# Patient Record
Sex: FEMALE | Race: WHITE | NOT HISPANIC OR LATINO | Employment: UNEMPLOYED | URBAN - METROPOLITAN AREA
[De-identification: names, ages, dates, MRNs, and addresses within clinical notes are randomized per-mention and may not be internally consistent; named-entity substitution may affect disease eponyms.]

---

## 2023-08-07 ENCOUNTER — APPOINTMENT (OUTPATIENT)
Dept: PRIMARY CARE | Facility: CLINIC | Age: 33
End: 2023-08-07
Payer: COMMERCIAL

## 2024-01-08 ENCOUNTER — OFFICE VISIT (OUTPATIENT)
Dept: PRIMARY CARE | Facility: CLINIC | Age: 34
End: 2024-01-08
Payer: COMMERCIAL

## 2024-01-08 ENCOUNTER — ANCILLARY PROCEDURE (OUTPATIENT)
Dept: RADIOLOGY | Facility: CLINIC | Age: 34
End: 2024-01-08
Payer: COMMERCIAL

## 2024-01-08 VITALS
SYSTOLIC BLOOD PRESSURE: 140 MMHG | BODY MASS INDEX: 51 KG/M2 | HEART RATE: 100 BPM | DIASTOLIC BLOOD PRESSURE: 90 MMHG | WEIGHT: 277.13 LBS | HEIGHT: 62 IN

## 2024-01-08 DIAGNOSIS — F20.9 SCHIZOPHRENIA, UNSPECIFIED TYPE (MULTI): ICD-10-CM

## 2024-01-08 DIAGNOSIS — E78.2 MIXED HYPERLIPIDEMIA: ICD-10-CM

## 2024-01-08 DIAGNOSIS — J45.40 MODERATE PERSISTENT ASTHMA WITHOUT COMPLICATION (HHS-HCC): ICD-10-CM

## 2024-01-08 DIAGNOSIS — E66.01 CLASS 3 SEVERE OBESITY DUE TO EXCESS CALORIES WITH SERIOUS COMORBIDITY AND BODY MASS INDEX (BMI) OF 50.0 TO 59.9 IN ADULT (MULTI): ICD-10-CM

## 2024-01-08 DIAGNOSIS — E06.3 HYPOTHYROIDISM DUE TO HASHIMOTO'S THYROIDITIS: ICD-10-CM

## 2024-01-08 DIAGNOSIS — E06.3 HASHIMOTO'S THYROIDITIS: ICD-10-CM

## 2024-01-08 DIAGNOSIS — Z12.4 CERVICAL CANCER SCREENING: ICD-10-CM

## 2024-01-08 DIAGNOSIS — E55.9 VITAMIN D DEFICIENCY: ICD-10-CM

## 2024-01-08 DIAGNOSIS — Z76.89 ENCOUNTER TO ESTABLISH CARE: Primary | ICD-10-CM

## 2024-01-08 DIAGNOSIS — E03.8 HYPOTHYROIDISM DUE TO HASHIMOTO'S THYROIDITIS: ICD-10-CM

## 2024-01-08 DIAGNOSIS — K21.9 GASTROESOPHAGEAL REFLUX DISEASE WITHOUT ESOPHAGITIS: ICD-10-CM

## 2024-01-08 DIAGNOSIS — F79 INTELLECTUAL DISABILITY: ICD-10-CM

## 2024-01-08 DIAGNOSIS — F31.30 BIPOLAR I DISORDER, MOST RECENT EPISODE DEPRESSED (MULTI): ICD-10-CM

## 2024-01-08 DIAGNOSIS — R73.01 ELEVATED FASTING GLUCOSE: ICD-10-CM

## 2024-01-08 DIAGNOSIS — F41.1 GENERALIZED ANXIETY DISORDER: ICD-10-CM

## 2024-01-08 PROBLEM — F40.240 CLAUSTROPHOBIA: Status: RESOLVED | Noted: 2024-01-08 | Resolved: 2024-01-08

## 2024-01-08 PROBLEM — E66.813 CLASS 3 SEVERE OBESITY DUE TO EXCESS CALORIES WITH SERIOUS COMORBIDITY AND BODY MASS INDEX (BMI) OF 50.0 TO 59.9 IN ADULT: Status: ACTIVE | Noted: 2024-01-08

## 2024-01-08 PROBLEM — M72.2 PLANTAR FASCIITIS: Status: RESOLVED | Noted: 2021-04-29 | Resolved: 2024-01-08

## 2024-01-08 PROBLEM — Z91.148 NONCOMPLIANCE WITH MEDICATION TREATMENT DUE TO UNDERUSE OF MEDICATION: Status: RESOLVED | Noted: 2023-10-06 | Resolved: 2024-01-08

## 2024-01-08 PROBLEM — E78.5 HYPERLIPIDEMIA: Status: ACTIVE | Noted: 2024-01-08

## 2024-01-08 PROBLEM — E05.00 GRAVES' DISEASE: Status: RESOLVED | Noted: 2020-11-06 | Resolved: 2024-01-08

## 2024-01-08 PROBLEM — H90.3 SENSORINEURAL HEARING LOSS (SNHL) OF BOTH EARS: Status: RESOLVED | Noted: 2021-10-07 | Resolved: 2024-01-08

## 2024-01-08 PROBLEM — E66.813 CLASS 3 SEVERE OBESITY DUE TO EXCESS CALORIES WITH SERIOUS COMORBIDITY AND BODY MASS INDEX (BMI) OF 50.0 TO 59.9 IN ADULT: Status: RESOLVED | Noted: 2024-01-08 | Resolved: 2024-01-08

## 2024-01-08 PROCEDURE — 1036F TOBACCO NON-USER: CPT | Performed by: NURSE PRACTITIONER

## 2024-01-08 PROCEDURE — 76536 US EXAM OF HEAD AND NECK: CPT

## 2024-01-08 PROCEDURE — 76536 US EXAM OF HEAD AND NECK: CPT | Performed by: RADIOLOGY

## 2024-01-08 PROCEDURE — 99204 OFFICE O/P NEW MOD 45 MIN: CPT | Performed by: NURSE PRACTITIONER

## 2024-01-08 PROCEDURE — 3008F BODY MASS INDEX DOCD: CPT | Performed by: NURSE PRACTITIONER

## 2024-01-08 RX ORDER — ATORVASTATIN CALCIUM 20 MG/1
20 TABLET, FILM COATED ORAL
Qty: 30 TABLET | Refills: 5 | Status: SHIPPED | OUTPATIENT
Start: 2024-01-08 | End: 2024-03-11 | Stop reason: SDUPTHER

## 2024-01-08 RX ORDER — CETIRIZINE HYDROCHLORIDE 10 MG/1
10 TABLET ORAL
Qty: 30 TABLET | Refills: 5 | Status: SHIPPED | OUTPATIENT
Start: 2024-01-08

## 2024-01-08 RX ORDER — FAMOTIDINE 40 MG/1
40 TABLET, FILM COATED ORAL DAILY
Qty: 30 TABLET | Refills: 5 | Status: SHIPPED | OUTPATIENT
Start: 2024-01-08 | End: 2024-03-11 | Stop reason: SDUPTHER

## 2024-01-08 RX ORDER — LEVOTHYROXINE SODIUM 100 UG/1
100 TABLET ORAL
Qty: 30 TABLET | Refills: 5 | Status: SHIPPED | OUTPATIENT
Start: 2024-01-08 | End: 2024-03-11 | Stop reason: SDUPTHER

## 2024-01-08 RX ORDER — LEVOTHYROXINE SODIUM 100 UG/1
100 TABLET ORAL
COMMUNITY
End: 2024-01-08 | Stop reason: SDUPTHER

## 2024-01-08 RX ORDER — BUSPIRONE HYDROCHLORIDE 7.5 MG/1
7.5 TABLET ORAL 2 TIMES DAILY
COMMUNITY
Start: 2023-09-12

## 2024-01-08 RX ORDER — ATORVASTATIN CALCIUM 20 MG/1
20 TABLET, FILM COATED ORAL
COMMUNITY
Start: 2023-09-03 | End: 2024-01-08 | Stop reason: SDUPTHER

## 2024-01-08 RX ORDER — FLUTICASONE PROPIONATE AND SALMETEROL 250; 50 UG/1; UG/1
1 POWDER RESPIRATORY (INHALATION) 2 TIMES DAILY
COMMUNITY
Start: 2023-02-13 | End: 2024-01-08

## 2024-01-08 RX ORDER — CETIRIZINE HYDROCHLORIDE 10 MG/1
10 TABLET ORAL
COMMUNITY
Start: 2023-02-13 | End: 2024-01-08 | Stop reason: SDUPTHER

## 2024-01-08 RX ORDER — LEVOTHYROXINE SODIUM 125 UG/1
125 TABLET ORAL
Qty: 8 TABLET | Refills: 2 | Status: SHIPPED | OUTPATIENT
Start: 2024-01-08 | End: 2024-03-11 | Stop reason: SDUPTHER

## 2024-01-08 RX ORDER — FAMOTIDINE 40 MG/1
40 TABLET, FILM COATED ORAL
COMMUNITY
Start: 2023-07-27 | End: 2024-01-08 | Stop reason: SDUPTHER

## 2024-01-08 RX ORDER — ALBUTEROL SULFATE 90 UG/1
2 AEROSOL, METERED RESPIRATORY (INHALATION) EVERY 6 HOURS PRN
COMMUNITY
Start: 2023-07-27 | End: 2024-01-15 | Stop reason: SDUPTHER

## 2024-01-08 RX ORDER — ARIPIPRAZOLE 15 MG/1
15 TABLET ORAL NIGHTLY
COMMUNITY
Start: 2024-01-02 | End: 2024-05-03 | Stop reason: SDUPTHER

## 2024-01-08 RX ORDER — FLUTICASONE PROPIONATE AND SALMETEROL XINAFOATE 115; 21 UG/1; UG/1
2 AEROSOL, METERED RESPIRATORY (INHALATION)
Qty: 12 G | Refills: 5 | Status: SHIPPED | OUTPATIENT
Start: 2024-01-08 | End: 2025-01-07

## 2024-01-08 RX ORDER — OMEPRAZOLE 40 MG/1
40 CAPSULE, DELAYED RELEASE ORAL
COMMUNITY
Start: 2023-11-27 | End: 2024-01-08 | Stop reason: ALTCHOICE

## 2024-01-08 RX ORDER — ERGOCALCIFEROL 1.25 MG/1
50000 CAPSULE ORAL
COMMUNITY
Start: 2023-11-27 | End: 2024-03-11 | Stop reason: SDUPTHER

## 2024-01-08 NOTE — PROGRESS NOTES
"Subjective   Patient ID: Brittany Shaver is a 33 y.o. female who presents for Establish Care.    HPI   NPV    Brittany here with her  to establish care. Was seeing [address] [place] [address] (Dr. Renner).  She has some mental health disorders and intellectual disabilities which she has an aide that helps her but according to her -she has fired 3 of them recently and is due to get a new one this week.     She has history of hashimoto, Graves, Vitamin D def, GERD, asthma, BRENTON, Hyperlipidemia,     Graves/Hashimoto: was put on levothyroxine, but is unsure what her last level was because she had her lab work drawn but did not go back for her results.     BRENTON:  has CPAP-Dr. Mitchell.     Asthma:  does not tolerate the powder advair. States it chokes her so she hasn't been using it but without it, it makes her short of breath and that has been getting worse.     Schizophrenia/Generalized Anxiety/bipolar depression: sees Cassy/Dr. Luis at [address] [place] [address].    Hyperlipidemia: reports she was started on medicine, but doesn't think she needs it. Is currently taking it, but wants her levels rechecked to see if she really needs it.     Needs a pap: she reports she has tried x2 with different providers, but was unable to tolerate the speculum. She states, \"I've never had a baby, but it was worse than childbirth, and I know I need to have one, but I am too small down there. My xxx had to get cut down there to be able to have that done.\" She is not currently sexually active due to this.     She also has complaints regarding her feet today, she is already set up with podiatry-dr fabian at [address] [place] [address]. I advised her to return there to get an appt as she has been working closely with her.    Tobacco: denies  ETOH: denies  Recreational Drugs: denies    Review of Systems   Constitutional:  Negative for fatigue.   Respiratory:  Negative for chest tightness and shortness of breath.    Cardiovascular:  Negative for chest pain, palpitations " "and leg swelling.   Gastrointestinal:  Negative for abdominal pain, blood in stool, constipation, diarrhea, nausea and vomiting.   Genitourinary:  Negative for dysuria.        Needs cervical pap exam  Unable to tolerate exam/speculum   Musculoskeletal:  Negative for arthralgias and myalgias.   Skin:  Negative for color change.   Neurological:  Negative for dizziness, light-headedness and headaches.   Psychiatric/Behavioral:  Positive for decreased concentration. Negative for agitation, behavioral problems, dysphoric mood, self-injury, sleep disturbance and suicidal ideas. The patient is nervous/anxious and is hyperactive.        Objective   /90   Pulse 100   Ht 1.575 m (5' 2\")   Wt 126 kg (277 lb 2 oz)   BMI 50.69 kg/m²     Physical Exam  Vitals and nursing note reviewed.   Constitutional:       Appearance: Normal appearance.   HENT:      Head: Normocephalic and atraumatic.   Cardiovascular:      Rate and Rhythm: Normal rate and regular rhythm.      Pulses: Normal pulses.      Heart sounds: Normal heart sounds.   Pulmonary:      Effort: Pulmonary effort is normal.      Breath sounds: Normal breath sounds.   Abdominal:      General: Bowel sounds are normal.      Palpations: Abdomen is soft.   Musculoskeletal:         General: Normal range of motion.      Cervical back: Normal range of motion.   Skin:     General: Skin is warm and dry.   Neurological:      General: No focal deficit present.      Mental Status: She is alert and oriented to person, place, and time.   Psychiatric:         Attention and Perception: Attention and perception normal.         Mood and Affect: Mood is elated.         Speech: Speech is tangential.         Behavior: Behavior is hyperactive. Behavior is cooperative.         Thought Content: Thought content normal.         Cognition and Memory: Cognition normal.         Assessment/Plan   Problem List Items Addressed This Visit             ICD-10-CM    Generalized anxiety disorder F41.1 "     On buspirone 7.5 mg BID  Managed by psychiatry-dr villafuerte         GERD (gastroesophageal reflux disease) K21.9     Famotidine 40 mg daily         Hyperlipidemia E78.5     Atorvastatin 20 mg daily  Lipid panel ordered         Relevant Medications    atorvastatin (Lipitor) 20 mg tablet    Other Relevant Orders    Comprehensive Metabolic Panel    Lipid Panel    Intellectual disability F79    Relevant Orders    Referral to Gynecology    Moderate persistent asthma without complication J45.40     Advair 115-21 mcg BID- refilled  Albuterol as needed         Relevant Medications    cetirizine (ZyrTEC) 10 mg tablet    famotidine (Pepcid) 40 mg tablet    fluticasone propion-salmeteroL (Advair HFA) 115-21 mcg/actuation inhaler    Other Relevant Orders    CBC and Auto Differential    Bipolar I disorder, most recent episode depressed (CMS/[facility]) F31.30     Abilify 15 mg nightly  Managed by psychiatry-dr. villafuerte         Schizophrenia (CMS/[facility]) F20.9     Managed by Dr. Villafuerte-psychiatry  Abilify 15 mg daily           Vitamin D deficiency E55.9     Vitamin D level  Vitamin D 50,000 units weekly         Relevant Orders    Vitamin D 25-Hydroxy,Total (for eval of Vitamin D levels)    Hypothyroidism due to Hashimoto's thyroiditis E03.8, E06.3     Continue levothyroxine 100 mcg daily on Monday-Friday  And increase levothyroxine 125 mcg daily on Saturday and Sunday only  We will recheck her levels in 6-8 weeks         Relevant Medications    levothyroxine (Synthroid, Levoxyl) 100 mcg tablet    levothyroxine (Synthroid, Levoxyl) 125 mcg tablet    Other Relevant Orders    T4, free    TSH    US thyroid (Completed)    Class 3 severe obesity due to excess calories with serious comorbidity and body mass index (BMI) of 50.0 to 59.9 in adult (CMS/[facility]) E66.01, Z68.43     Pt advised to institute a healthy, well-balanced meal with portion control and to exercise daily for at least 30-60 minutes.         Relevant Orders    Comprehensive Metabolic  Panel    Hemoglobin A1C     Other Visit Diagnoses         Codes    Encounter to establish care    -  Primary Z76.89    Cervical cancer screening     Z12.4    Relevant Orders    Referral to Gynecology    Elevated fasting glucose     R73.01    Relevant Orders    Hemoglobin A1C               Follow up in 2 months.  We will call with abnormal results, but otherwise will review at her appt.     [facility] As I believe they do under anesthesia for those who cannot tolerate exams.

## 2024-01-12 PROBLEM — E03.8 HYPOTHYROIDISM DUE TO HASHIMOTO'S THYROIDITIS: Status: ACTIVE | Noted: 2020-11-06

## 2024-01-12 ASSESSMENT — ENCOUNTER SYMPTOMS
NERVOUS/ANXIOUS: 1
FATIGUE: 0
BLOOD IN STOOL: 0
NAUSEA: 0
PALPITATIONS: 0
HYPERACTIVE: 1
DIZZINESS: 0
DYSPHORIC MOOD: 0
ARTHRALGIAS: 0
DECREASED CONCENTRATION: 1
SLEEP DISTURBANCE: 0
CHEST TIGHTNESS: 0
MYALGIAS: 0
LIGHT-HEADEDNESS: 0
CONSTIPATION: 0
DIARRHEA: 0
SHORTNESS OF BREATH: 0
DYSURIA: 0
HEADACHES: 0
AGITATION: 0
ABDOMINAL PAIN: 0
COLOR CHANGE: 0
VOMITING: 0

## 2024-01-13 NOTE — ASSESSMENT & PLAN NOTE
Continue levothyroxine 100 mcg daily on Monday-Friday  And increase levothyroxine 125 mcg daily on Saturday and Sunday only  We will recheck her levels in 6-8 weeks

## 2024-01-13 NOTE — PATIENT INSTRUCTIONS
BMI was above normal measurement. Current weight: 277 lb 2 oz   Weight change since last visit (-) denotes wt loss     Weight loss needed to achieve BMI 25:   Lbs  Weight loss needed to achieve BMI 30:   Lbs  Provided instructions on dietary changes  Provided instructions on exercise  Advised to Increase physical activity

## 2024-01-15 DIAGNOSIS — J45.40 MODERATE PERSISTENT ASTHMA WITHOUT COMPLICATION (HHS-HCC): Primary | ICD-10-CM

## 2024-01-15 RX ORDER — ALBUTEROL SULFATE 90 UG/1
2 AEROSOL, METERED RESPIRATORY (INHALATION) EVERY 6 HOURS PRN
Qty: 18 G | Refills: 1 | Status: SHIPPED | OUTPATIENT
Start: 2024-01-15 | End: 2025-01-14

## 2024-01-15 NOTE — RESULT ENCOUNTER NOTE
Let her know that her thyroid US showed that she has no nodules, but her thyroid is inflamed and confirms hashimoto's thyroiditis. She will need to continue on her thyroid medication as instructed and repeat labs in February as ordered.   Also, please let her know I put a referral in for [facility]YN at [facility] regarding possibly doing her GYN exam under anesthesia since she cannot tolerate.

## 2024-01-16 ENCOUNTER — TELEPHONE (OUTPATIENT)
Dept: PRIMARY CARE | Facility: CLINIC | Age: 34
End: 2024-01-16

## 2024-01-16 NOTE — TELEPHONE ENCOUNTER
Brittany stopped in and asking if she could be put back on BP medication.     Her BP today 156 she can't remember what the bottom number was, but she said it was high.     Luiza is also taking her back to check her BP while she stopped in.

## 2024-01-24 NOTE — PROGRESS NOTES
Referral was done, the day of last appointment. It is my mistake I forgot to chart, note now charted in her chart. Thank you

## 2024-03-11 ENCOUNTER — OFFICE VISIT (OUTPATIENT)
Dept: PRIMARY CARE | Facility: CLINIC | Age: 34
End: 2024-03-11
Payer: COMMERCIAL

## 2024-03-11 VITALS
BODY MASS INDEX: 49.53 KG/M2 | DIASTOLIC BLOOD PRESSURE: 88 MMHG | SYSTOLIC BLOOD PRESSURE: 134 MMHG | HEART RATE: 97 BPM | WEIGHT: 269.13 LBS | HEIGHT: 62 IN

## 2024-03-11 DIAGNOSIS — R74.01 ELEVATED ALT MEASUREMENT: ICD-10-CM

## 2024-03-11 DIAGNOSIS — J45.40 MODERATE PERSISTENT ASTHMA WITHOUT COMPLICATION (HHS-HCC): ICD-10-CM

## 2024-03-11 DIAGNOSIS — E66.01 CLASS 3 SEVERE OBESITY DUE TO EXCESS CALORIES WITH SERIOUS COMORBIDITY AND BODY MASS INDEX (BMI) OF 45.0 TO 49.9 IN ADULT (MULTI): ICD-10-CM

## 2024-03-11 DIAGNOSIS — E03.8 HYPOTHYROIDISM DUE TO HASHIMOTO'S THYROIDITIS: Primary | ICD-10-CM

## 2024-03-11 DIAGNOSIS — F20.9 SCHIZOPHRENIA, UNSPECIFIED TYPE (MULTI): ICD-10-CM

## 2024-03-11 DIAGNOSIS — E55.9 VITAMIN D DEFICIENCY: ICD-10-CM

## 2024-03-11 DIAGNOSIS — R79.89 ELEVATED PLATELET COUNT: ICD-10-CM

## 2024-03-11 DIAGNOSIS — E78.2 MIXED HYPERLIPIDEMIA: ICD-10-CM

## 2024-03-11 DIAGNOSIS — R73.03 PREDIABETES: ICD-10-CM

## 2024-03-11 DIAGNOSIS — E06.3 HYPOTHYROIDISM DUE TO HASHIMOTO'S THYROIDITIS: Primary | ICD-10-CM

## 2024-03-11 PROCEDURE — 99214 OFFICE O/P EST MOD 30 MIN: CPT | Performed by: NURSE PRACTITIONER

## 2024-03-11 PROCEDURE — 3008F BODY MASS INDEX DOCD: CPT | Performed by: NURSE PRACTITIONER

## 2024-03-11 PROCEDURE — 1036F TOBACCO NON-USER: CPT | Performed by: NURSE PRACTITIONER

## 2024-03-11 RX ORDER — NYSTATIN 100000 [USP'U]/G
POWDER TOPICAL
COMMUNITY
Start: 2024-02-08 | End: 2025-02-07

## 2024-03-11 RX ORDER — FAMOTIDINE 40 MG/1
40 TABLET, FILM COATED ORAL DAILY
Qty: 30 TABLET | Refills: 5 | Status: SHIPPED | OUTPATIENT
Start: 2024-03-11

## 2024-03-11 RX ORDER — ERGOCALCIFEROL 1.25 MG/1
50000 CAPSULE ORAL
Qty: 4 CAPSULE | Refills: 5 | Status: SHIPPED | OUTPATIENT
Start: 2024-03-11

## 2024-03-11 RX ORDER — ATORVASTATIN CALCIUM 20 MG/1
20 TABLET, FILM COATED ORAL
Qty: 30 TABLET | Refills: 5 | Status: SHIPPED | OUTPATIENT
Start: 2024-03-11

## 2024-03-11 RX ORDER — LEVOTHYROXINE SODIUM 125 UG/1
125 TABLET ORAL
Qty: 8 TABLET | Refills: 5 | Status: SHIPPED | OUTPATIENT
Start: 2024-03-11 | End: 2024-05-24 | Stop reason: DRUGHIGH

## 2024-03-11 RX ORDER — LEVOTHYROXINE SODIUM 100 UG/1
100 TABLET ORAL
Qty: 30 TABLET | Refills: 5 | Status: SHIPPED | OUTPATIENT
Start: 2024-03-11 | End: 2024-05-24 | Stop reason: DRUGHIGH

## 2024-03-11 ASSESSMENT — ENCOUNTER SYMPTOMS
ABDOMINAL PAIN: 0
LIGHT-HEADEDNESS: 0
NAUSEA: 0
COLOR CHANGE: 0
PALPITATIONS: 0
VOMITING: 0
MYALGIAS: 0
FATIGUE: 0
DIARRHEA: 0
SHORTNESS OF BREATH: 0
CONSTIPATION: 0
NERVOUS/ANXIOUS: 1
BLOOD IN STOOL: 0
HEADACHES: 0
DYSURIA: 0
DIZZINESS: 0
CHEST TIGHTNESS: 0
ARTHRALGIAS: 0

## 2024-03-11 NOTE — PROGRESS NOTES
"Subjective   Patient ID: Brittany Shaver is a 33 y.o. female who presents for Follow-up.    SAM Buenrostro returns with  for lab review.     Liver enzymes: slightly elevated    Prediabetes: A1c 5.8%-watch extra sugars.    Elevated platelet count: has been elevated in the past.     Hypothyroid: Continue on current medication.         Review of Systems   Constitutional:  Negative for fatigue.   Respiratory:  Negative for chest tightness and shortness of breath.    Cardiovascular:  Negative for chest pain, palpitations and leg swelling.   Gastrointestinal:  Negative for abdominal pain, blood in stool, constipation, diarrhea, nausea and vomiting.   Genitourinary:  Negative for dysuria.   Musculoskeletal:  Negative for arthralgias and myalgias.   Skin:  Negative for color change.   Neurological:  Negative for dizziness, light-headedness and headaches.   Psychiatric/Behavioral:  The patient is nervous/anxious.        Objective   /88   Pulse 97   Ht 1.575 m (5' 2\")   Wt 122 kg (269 lb 2 oz)   BMI 49.22 kg/m²     Physical Exam  Vitals and nursing note reviewed. Exam conducted with a chaperone present.   Constitutional:       Appearance: Normal appearance.   HENT:      Head: Normocephalic and atraumatic.   Cardiovascular:      Rate and Rhythm: Normal rate and regular rhythm.      Pulses: Normal pulses.      Heart sounds: Normal heart sounds.   Pulmonary:      Effort: Pulmonary effort is normal.      Breath sounds: Normal breath sounds.   Abdominal:      General: Bowel sounds are normal.      Palpations: Abdomen is soft.   Musculoskeletal:         General: Normal range of motion.   Skin:     General: Skin is warm and dry.   Neurological:      General: No focal deficit present.      Mental Status: She is alert and oriented to person, place, and time.   Psychiatric:         Mood and Affect: Mood normal.         Behavior: Behavior normal.         Thought Content: Thought content normal.         Judgment: Judgment " normal.         Assessment/Plan   Problem List Items Addressed This Visit             ICD-10-CM    Hyperlipidemia E78.5     3/6/24: Chol 158, Tri 74, LDL 79, HDL 64  Continue on Atorvastatin 20 mg daily          Relevant Medications    atorvastatin (Lipitor) 20 mg tablet    Moderate persistent asthma without complication J45.40     Albuterol as needed         Relevant Medications    famotidine (Pepcid) 40 mg tablet    Schizophrenia (CMS/[facility]) F20.9     Managed by psychiatry-dr villafuerte  Has appt tomorrow.         Vitamin D deficiency E55.9     3/6/24: Vitamin D level 25  Continue vitamin D 50,000 units weekly- refilled         Relevant Medications    ergocalciferol (Vitamin D-2) 1.25 MG (82777 UT) capsule    Other Relevant Orders    Vitamin D 25-Hydroxy,Total (for eval of Vitamin D levels)    Hypothyroidism due to Hashimoto's thyroiditis - Primary E03.8, E06.3     3/6/24: TSH 5.70, Free T4 1.4, T3 2.9, .4    US thyroid done 1/8/24: extremely heterogeneous-no discrete nodules detected.     Levothyroxine 100 mcg daily Monday-Friday  Levothyroxine 125 mcg on Saturday and Sunday only         Relevant Medications    levothyroxine (Synthroid, Levoxyl) 100 mcg tablet    levothyroxine (Synthroid, Levoxyl) 125 mcg tablet    Other Relevant Orders    TSH with reflex to Free T4 if abnormal    Class 3 severe obesity due to excess calories with serious comorbidity and body mass index (BMI) of 45.0 to 49.9 in adult (CMS/[facility]) E66.01, Z68.42    Elevated ALT measurement R74.01     3/6/24: AST 34, ALT 43, ALK Phos 126, Total Bili <0.2  Will recheck in 6 months         Relevant Orders    Comprehensive Metabolic Panel    Elevated platelet count R79.89     Has been chronically elevated.  Will recheck in 6 months  May consider a referral to hematology         Relevant Orders    CBC and Auto Differential    Prediabetes R73.03     3/6/24: A1c 5.8%    Will recheck this in 6 months         Relevant Orders    Hemoglobin A1C         Follow  up in 6 months with lab work prior to appointment.

## 2024-03-11 NOTE — ASSESSMENT & PLAN NOTE
3/6/24: TSH 5.70, Free T4 1.4, T3 2.9, .4    US thyroid done 1/8/24: extremely heterogeneous-no discrete nodules detected.     Levothyroxine 100 mcg daily Monday-Friday  Levothyroxine 125 mcg on Saturday and Sunday only

## 2024-04-09 ENCOUNTER — TELEPHONE (OUTPATIENT)
Dept: PRIMARY CARE | Facility: CLINIC | Age: 34
End: 2024-04-09
Payer: COMMERCIAL

## 2024-04-09 NOTE — TELEPHONE ENCOUNTER
"Brittany stopped in the office and wanted to speak with you. When I advised her that you were not in the office she asked if I could relay a message. She said that she believes she has \"Tics\". Her symptoms are mouth jerking, eyes twitching and a lot of other symptoms. Symptoms have gotten worse the last couple of weeks. She wants to know if there is anything you suggest that she do, should she schedule an appointment.   "

## 2024-05-03 ENCOUNTER — OFFICE VISIT (OUTPATIENT)
Dept: PRIMARY CARE | Facility: CLINIC | Age: 34
End: 2024-05-03
Payer: COMMERCIAL

## 2024-05-03 VITALS
BODY MASS INDEX: 49.5 KG/M2 | SYSTOLIC BLOOD PRESSURE: 142 MMHG | HEART RATE: 87 BPM | WEIGHT: 269 LBS | HEIGHT: 62 IN | DIASTOLIC BLOOD PRESSURE: 80 MMHG

## 2024-05-03 DIAGNOSIS — E06.3 HYPOTHYROIDISM DUE TO HASHIMOTO'S THYROIDITIS: ICD-10-CM

## 2024-05-03 DIAGNOSIS — R03.0 ELEVATED BLOOD PRESSURE READING: ICD-10-CM

## 2024-05-03 DIAGNOSIS — K62.5 BRBPR (BRIGHT RED BLOOD PER RECTUM): Primary | ICD-10-CM

## 2024-05-03 DIAGNOSIS — E03.8 HYPOTHYROIDISM DUE TO HASHIMOTO'S THYROIDITIS: ICD-10-CM

## 2024-05-03 PROCEDURE — 99214 OFFICE O/P EST MOD 30 MIN: CPT | Performed by: NURSE PRACTITIONER

## 2024-05-03 PROCEDURE — 3008F BODY MASS INDEX DOCD: CPT | Performed by: NURSE PRACTITIONER

## 2024-05-03 RX ORDER — ARIPIPRAZOLE 15 MG/1
15 TABLET ORAL NIGHTLY
Qty: 30 TABLET | Refills: 1 | Status: CANCELLED | OUTPATIENT
Start: 2024-05-03

## 2024-05-03 RX ORDER — ARIPIPRAZOLE 20 MG/1
TABLET ORAL
COMMUNITY
Start: 2024-04-11

## 2024-05-03 RX ORDER — BENZTROPINE MESYLATE 0.5 MG/1
0.5 TABLET ORAL 2 TIMES DAILY
COMMUNITY
Start: 2024-04-11 | End: 2024-05-24 | Stop reason: WASHOUT

## 2024-05-03 RX ORDER — METOPROLOL SUCCINATE 25 MG/1
25 TABLET, EXTENDED RELEASE ORAL DAILY
Qty: 30 TABLET | Refills: 1 | Status: SHIPPED | OUTPATIENT
Start: 2024-05-03

## 2024-05-03 RX ORDER — DROSPIRENONE 4 MG/1
4 TABLET, FILM COATED ORAL
COMMUNITY
Start: 2024-03-29

## 2024-05-03 NOTE — PATIENT INSTRUCTIONS
Get your blood work prior to your appointment in 2 week.    Lab is open on Tuesday and Thursday from 730-4 pm. Closed from 12-1 for lunch.

## 2024-05-03 NOTE — PROGRESS NOTES
"Subjective   Patient ID: Brittany Shaver is a 33 y.o. female who presents for Anxiety.    HPI   Chitra returns for concerns of increase in her anxiety.     Feels gasping of air. Feels like her eyes are twitching when she gets gasping air, feeling panicky. Anxiety increased. Feeling hyper. Feeling dizzy, feels like her heart racing.     Bright red rectum: has noticed blood when she has \"sharp\" bowel movements. Reports it has been going on for \"a year\" but has been getting worse. Does have family history of colon cancer.     Needs recheck on her thyroid-she will get this next week.     Elevated blood pressure noted during exam today.       Review of Systems   HENT: Negative.     Respiratory:  Positive for shortness of breath.    Gastrointestinal:  Positive for blood in stool.   Genitourinary: Negative.    Musculoskeletal: Negative.    Neurological:  Positive for dizziness and light-headedness.   Psychiatric/Behavioral:  Negative for sleep disturbance and suicidal ideas. The patient is nervous/anxious.        Objective   /80   Pulse 87   Ht 1.575 m (5' 2\")   Wt 122 kg (269 lb)   BMI 49.20 kg/m²     Physical Exam  Vitals and nursing note reviewed.   Constitutional:       Appearance: Normal appearance.   HENT:      Head: Normocephalic and atraumatic.   Cardiovascular:      Rate and Rhythm: Normal rate and regular rhythm.      Pulses: Normal pulses.      Heart sounds: Normal heart sounds.   Pulmonary:      Effort: Pulmonary effort is normal.      Breath sounds: Normal breath sounds.   Musculoskeletal:         General: Normal range of motion.      Cervical back: Normal range of motion.   Skin:     General: Skin is warm and dry.   Neurological:      General: No focal deficit present.      Mental Status: She is alert and oriented to person, place, and time.   Psychiatric:         Mood and Affect: Mood normal.         Behavior: Behavior normal.         Thought Content: Thought content normal.         Judgment: Judgment " normal.       Assessment/Plan   Problem List Items Addressed This Visit             ICD-10-CM    Hypothyroidism due to Hashimoto's thyroiditis E03.8, E06.3     Recheck TSH         Relevant Orders    TSH with reflex to Free T4 if abnormal (Completed)    Elevated blood pressure reading R03.0     Start Metoprolol 25 mg daily.         Relevant Medications    metoprolol succinate XL (Toprol-XL) 25 mg 24 hr tablet     Other Visit Diagnoses         Codes    BRBPR (bright red blood per rectum)    -  Primary K62.5    Relevant Orders    Referral to General Surgery              Follow up in 2 weeks for BP check. We will contact her with the result of her thyroid function to adjust her levothyroxine if needed.

## 2024-05-07 ENCOUNTER — LAB (OUTPATIENT)
Dept: LAB | Facility: LAB | Age: 34
End: 2024-05-07
Payer: COMMERCIAL

## 2024-05-07 DIAGNOSIS — E55.9 VITAMIN D DEFICIENCY: ICD-10-CM

## 2024-05-07 DIAGNOSIS — E06.3 HYPOTHYROIDISM DUE TO HASHIMOTO'S THYROIDITIS: ICD-10-CM

## 2024-05-07 DIAGNOSIS — R73.01 ELEVATED FASTING GLUCOSE: ICD-10-CM

## 2024-05-07 DIAGNOSIS — E78.2 MIXED HYPERLIPIDEMIA: ICD-10-CM

## 2024-05-07 DIAGNOSIS — J45.40 MODERATE PERSISTENT ASTHMA WITHOUT COMPLICATION (HHS-HCC): ICD-10-CM

## 2024-05-07 DIAGNOSIS — E03.8 HYPOTHYROIDISM DUE TO HASHIMOTO'S THYROIDITIS: ICD-10-CM

## 2024-05-07 DIAGNOSIS — E66.01 CLASS 3 SEVERE OBESITY DUE TO EXCESS CALORIES WITH SERIOUS COMORBIDITY AND BODY MASS INDEX (BMI) OF 50.0 TO 59.9 IN ADULT (MULTI): ICD-10-CM

## 2024-05-07 LAB
25(OH)D3 SERPL-MCNC: 21 NG/ML (ref 30–100)
ALBUMIN SERPL BCP-MCNC: 4.1 G/DL (ref 3.4–5)
ALP SERPL-CCNC: 128 U/L (ref 33–110)
ALT SERPL W P-5'-P-CCNC: 34 U/L (ref 7–45)
ANION GAP SERPL CALC-SCNC: 9 MMOL/L (ref 10–20)
AST SERPL W P-5'-P-CCNC: 21 U/L (ref 9–39)
BASOPHILS # BLD AUTO: 0.08 X10*3/UL (ref 0–0.1)
BASOPHILS NFR BLD AUTO: 0.8 %
BILIRUB SERPL-MCNC: 0.4 MG/DL (ref 0–1.2)
BUN SERPL-MCNC: 17 MG/DL (ref 6–23)
CALCIUM SERPL-MCNC: 8.9 MG/DL (ref 8.6–10.3)
CHLORIDE SERPL-SCNC: 103 MMOL/L (ref 98–107)
CHOLEST SERPL-MCNC: 181 MG/DL (ref 0–199)
CHOLESTEROL/HDL RATIO: 3.1
CO2 SERPL-SCNC: 29 MMOL/L (ref 21–32)
CREAT SERPL-MCNC: 0.57 MG/DL (ref 0.5–1.05)
EGFRCR SERPLBLD CKD-EPI 2021: >90 ML/MIN/1.73M*2
EOSINOPHIL # BLD AUTO: 0.37 X10*3/UL (ref 0–0.7)
EOSINOPHIL NFR BLD AUTO: 3.5 %
ERYTHROCYTE [DISTWIDTH] IN BLOOD BY AUTOMATED COUNT: 15.8 % (ref 11.5–14.5)
EST. AVERAGE GLUCOSE BLD GHB EST-MCNC: 123 MG/DL
GLUCOSE SERPL-MCNC: 111 MG/DL (ref 74–99)
HBA1C MFR BLD: 5.9 %
HCT VFR BLD AUTO: 43 % (ref 36–46)
HDLC SERPL-MCNC: 59 MG/DL
HGB BLD-MCNC: 13 G/DL (ref 12–16)
IMM GRANULOCYTES # BLD AUTO: 0.03 X10*3/UL (ref 0–0.7)
IMM GRANULOCYTES NFR BLD AUTO: 0.3 % (ref 0–0.9)
LDLC SERPL CALC-MCNC: 94 MG/DL
LYMPHOCYTES # BLD AUTO: 3.28 X10*3/UL (ref 1.2–4.8)
LYMPHOCYTES NFR BLD AUTO: 31.1 %
MCH RBC QN AUTO: 27.3 PG (ref 26–34)
MCHC RBC AUTO-ENTMCNC: 30.2 G/DL (ref 32–36)
MCV RBC AUTO: 90 FL (ref 80–100)
MONOCYTES # BLD AUTO: 0.74 X10*3/UL (ref 0.1–1)
MONOCYTES NFR BLD AUTO: 7 %
NEUTROPHILS # BLD AUTO: 6.04 X10*3/UL (ref 1.2–7.7)
NEUTROPHILS NFR BLD AUTO: 57.3 %
NON HDL CHOLESTEROL: 122 MG/DL (ref 0–149)
NRBC BLD-RTO: 0 /100 WBCS (ref 0–0)
PLATELET # BLD AUTO: 495 X10*3/UL (ref 150–450)
POTASSIUM SERPL-SCNC: 5 MMOL/L (ref 3.5–5.3)
PROT SERPL-MCNC: 6.8 G/DL (ref 6.4–8.2)
RBC # BLD AUTO: 4.76 X10*6/UL (ref 4–5.2)
SODIUM SERPL-SCNC: 136 MMOL/L (ref 136–145)
T4 FREE SERPL-MCNC: 0.74 NG/DL (ref 0.61–1.12)
TRIGL SERPL-MCNC: 141 MG/DL (ref 0–149)
TSH SERPL-ACNC: 15.92 MIU/L (ref 0.44–3.98)
VLDL: 28 MG/DL (ref 0–40)
WBC # BLD AUTO: 10.5 X10*3/UL (ref 4.4–11.3)

## 2024-05-07 PROCEDURE — 36415 COLL VENOUS BLD VENIPUNCTURE: CPT

## 2024-05-07 PROCEDURE — 85025 COMPLETE CBC W/AUTO DIFF WBC: CPT

## 2024-05-07 PROCEDURE — 80053 COMPREHEN METABOLIC PANEL: CPT

## 2024-05-07 PROCEDURE — 82306 VITAMIN D 25 HYDROXY: CPT

## 2024-05-07 PROCEDURE — 84481 FREE ASSAY (FT-3): CPT

## 2024-05-07 PROCEDURE — 80061 LIPID PANEL: CPT

## 2024-05-07 PROCEDURE — 86376 MICROSOMAL ANTIBODY EACH: CPT

## 2024-05-07 PROCEDURE — 84443 ASSAY THYROID STIM HORMONE: CPT

## 2024-05-07 PROCEDURE — 84439 ASSAY OF FREE THYROXINE: CPT

## 2024-05-07 PROCEDURE — 83036 HEMOGLOBIN GLYCOSYLATED A1C: CPT

## 2024-05-08 LAB
T3FREE SERPL-MCNC: 2.8 PG/ML (ref 2.3–4.2)
THYROPEROXIDASE AB SERPL-ACNC: 582 IU/ML

## 2024-05-16 PROBLEM — R03.0 ELEVATED BLOOD PRESSURE READING: Status: ACTIVE | Noted: 2024-05-16

## 2024-05-16 ASSESSMENT — ENCOUNTER SYMPTOMS
BLOOD IN STOOL: 1
NERVOUS/ANXIOUS: 1
LIGHT-HEADEDNESS: 1
SHORTNESS OF BREATH: 1
MUSCULOSKELETAL NEGATIVE: 1
SLEEP DISTURBANCE: 0
DIZZINESS: 1

## 2024-05-24 ENCOUNTER — OFFICE VISIT (OUTPATIENT)
Dept: PRIMARY CARE | Facility: CLINIC | Age: 34
End: 2024-05-24
Payer: COMMERCIAL

## 2024-05-24 VITALS
DIASTOLIC BLOOD PRESSURE: 80 MMHG | HEIGHT: 62 IN | HEART RATE: 57 BPM | SYSTOLIC BLOOD PRESSURE: 132 MMHG | WEIGHT: 269 LBS | BODY MASS INDEX: 49.5 KG/M2

## 2024-05-24 DIAGNOSIS — R25.1 EPISODE OF SHAKING: ICD-10-CM

## 2024-05-24 DIAGNOSIS — R25.3 TWITCHING: ICD-10-CM

## 2024-05-24 DIAGNOSIS — R03.0 ELEVATED BLOOD PRESSURE READING: ICD-10-CM

## 2024-05-24 DIAGNOSIS — J44.89 COPD WITH ASTHMA (MULTI): ICD-10-CM

## 2024-05-24 DIAGNOSIS — E03.8 HYPOTHYROIDISM DUE TO HASHIMOTO'S THYROIDITIS: Primary | ICD-10-CM

## 2024-05-24 DIAGNOSIS — E06.3 HYPOTHYROIDISM DUE TO HASHIMOTO'S THYROIDITIS: Primary | ICD-10-CM

## 2024-05-24 PROCEDURE — 3008F BODY MASS INDEX DOCD: CPT | Performed by: NURSE PRACTITIONER

## 2024-05-24 PROCEDURE — 99214 OFFICE O/P EST MOD 30 MIN: CPT | Performed by: NURSE PRACTITIONER

## 2024-05-24 PROCEDURE — 1036F TOBACCO NON-USER: CPT | Performed by: NURSE PRACTITIONER

## 2024-05-24 RX ORDER — LEVOTHYROXINE SODIUM 125 UG/1
125 TABLET ORAL DAILY
Qty: 90 TABLET | Refills: 0 | Status: SHIPPED | OUTPATIENT
Start: 2024-05-24

## 2024-05-24 ASSESSMENT — ENCOUNTER SYMPTOMS
HEADACHES: 0
ARTHRALGIAS: 0
STRIDOR: 0
DIZZINESS: 1
ABDOMINAL PAIN: 0
SHORTNESS OF BREATH: 1
PSYCHIATRIC NEGATIVE: 1
PALPITATIONS: 0
CONSTIPATION: 0
DYSURIA: 0
BLOOD IN STOOL: 0
COLOR CHANGE: 0
DIARRHEA: 0
TREMORS: 1
LIGHT-HEADEDNESS: 0
VOMITING: 0
FATIGUE: 0
MYALGIAS: 0
WHEEZING: 0
NAUSEA: 0
CHEST TIGHTNESS: 0

## 2024-05-24 NOTE — PROGRESS NOTES
"Subjective   Patient ID: Brittany Shaver is a 34 y.o. female who presents for Follow-up.    SAM Buenrostro returns for follow up.    Elevated blood pressure/Heart racing: getting better, was started on metoprolol at last visit.     Thinks she is having seizures. She is also getting jerking episodes as well. She reports her eye will roll back in her head. She also reports that she has been getting dizzy spells. She brought a video recording of herself on her phone of one of her episodes. She states, \"she is glad she was able to get it on camera, so someone can see what is going on.\"     Hypothyroid: her levels increased TSH 15.92. will increase her thyroid medication to 125 mcg daily. She reports that she is taking her medication as prescribed.     Breathing problems: she has not been using her Advair, she has only been using her albuterol. She reports she has been getting short of breath more often and had difficulty walking down our hallway to get to our office today.         Review of Systems   Constitutional:  Negative for fatigue.   Respiratory:  Positive for shortness of breath. Negative for chest tightness, wheezing and stridor.    Cardiovascular:  Negative for chest pain, palpitations and leg swelling.   Gastrointestinal:  Negative for abdominal pain, blood in stool, constipation, diarrhea, nausea and vomiting.   Genitourinary:  Negative for dysuria.   Musculoskeletal:  Negative for arthralgias and myalgias.   Skin:  Negative for color change.   Neurological:  Positive for dizziness and tremors. Negative for light-headedness and headaches.   Psychiatric/Behavioral: Negative.         Objective   /80   Pulse 57   Ht 1.575 m (5' 2\")   Wt 122 kg (269 lb)   BMI 49.20 kg/m²     Physical Exam  Vitals and nursing note reviewed.   Constitutional:       Appearance: Normal appearance.   Cardiovascular:      Rate and Rhythm: Normal rate and regular rhythm.      Heart sounds: Normal heart sounds.   Pulmonary:      " Effort: Pulmonary effort is normal.      Breath sounds: Normal breath sounds.   Musculoskeletal:         General: Normal range of motion.      Cervical back: Normal range of motion.   Skin:     General: Skin is warm and dry.   Neurological:      Mental Status: She is alert and oriented to person, place, and time. Mental status is at baseline.   Psychiatric:         Mood and Affect: Mood normal.         Behavior: Behavior normal.         Thought Content: Thought content normal.         Judgment: Judgment normal.         Assessment/Plan   Problem List Items Addressed This Visit             ICD-10-CM    Hypothyroidism due to Hashimoto's thyroiditis - Primary E03.8, E06.3    Relevant Medications    levothyroxine (Synthroid, Levoxyl) 125 mcg tablet    Other Relevant Orders    TSH with reflex to Free T4 if abnormal    Elevated blood pressure reading R03.0     Continue Metoprolol 25 mg daily.         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          Other Visit Diagnoses         Codes    Twitching     R25.3    Relevant Orders    Referral to Neurology    Episode of shaking     R25.1    Relevant Orders    Referral to Neurology              Follow up in 3 months. She is to bring her medications with her.

## 2024-05-24 NOTE — LETTER
[facility]  [address]  [address]  [facility] [address]  Dept: [phone]        Please allow Brittany Shaver to have her emotional support animal live with her due to her anxiety, depression, and other mental health disorders. Emotional support animals have been shown to decrease levels of anxiety/depression and bring levels of comfort for people who suffer from these disorders.         If you have any questions, please do not hesitate to contact my office.          AJ Lindquist, CNP

## 2024-05-24 NOTE — PATIENT INSTRUCTIONS
Stop levothyroxine 100 mcg.    Take levothyroxine 125 mcg every day.   Repeat lab work in 7 weeks.  No sooner than July 11th.     Take Advair inhaler 2 puffs in the morning and 2 puffs in the evening. Rinse your mouth out after.

## 2024-06-26 ENCOUNTER — TELEPHONE (OUTPATIENT)
Dept: PRIMARY CARE | Facility: CLINIC | Age: 34
End: 2024-06-26

## 2024-06-26 RX ORDER — BENZTROPINE MESYLATE 0.5 MG/1
TABLET ORAL
COMMUNITY
Start: 2024-06-19

## 2024-06-26 RX ORDER — BUSPIRONE HYDROCHLORIDE 5 MG/1
5 TABLET ORAL
COMMUNITY
Start: 2024-06-19

## 2024-06-26 RX ORDER — MELOXICAM 15 MG/1
15 TABLET ORAL
COMMUNITY
Start: 2024-06-21

## 2024-06-26 NOTE — TELEPHONE ENCOUNTER
Barabara stopped in and wanted to let you know that her psychiatry doctor put her on Buspirone, an extra 5 mg in the morning. And her podiatry doctor put her on Meloxicam 15 mg

## 2024-07-11 DIAGNOSIS — R03.0 ELEVATED BLOOD PRESSURE READING: ICD-10-CM

## 2024-07-11 DIAGNOSIS — J45.40 MODERATE PERSISTENT ASTHMA WITHOUT COMPLICATION (HHS-HCC): ICD-10-CM

## 2024-07-11 RX ORDER — ALBUTEROL SULFATE 90 UG/1
2 AEROSOL, METERED RESPIRATORY (INHALATION) EVERY 6 HOURS PRN
Qty: 18 G | Refills: 1 | Status: SHIPPED | OUTPATIENT
Start: 2024-07-11 | End: 2025-07-11

## 2024-07-11 RX ORDER — METOPROLOL SUCCINATE 25 MG/1
25 TABLET, EXTENDED RELEASE ORAL DAILY
Qty: 90 TABLET | Refills: 0 | Status: SHIPPED | OUTPATIENT
Start: 2024-07-11

## 2024-08-08 ENCOUNTER — LAB (OUTPATIENT)
Dept: LAB | Facility: LAB | Age: 34
End: 2024-08-08
Payer: COMMERCIAL

## 2024-08-08 DIAGNOSIS — R73.03 PREDIABETES: ICD-10-CM

## 2024-08-08 DIAGNOSIS — R79.89 ELEVATED PLATELET COUNT: ICD-10-CM

## 2024-08-08 DIAGNOSIS — E55.9 VITAMIN D DEFICIENCY: ICD-10-CM

## 2024-08-08 DIAGNOSIS — E06.3 HYPOTHYROIDISM DUE TO HASHIMOTO'S THYROIDITIS: ICD-10-CM

## 2024-08-08 DIAGNOSIS — E03.8 HYPOTHYROIDISM DUE TO HASHIMOTO'S THYROIDITIS: ICD-10-CM

## 2024-08-08 DIAGNOSIS — R74.01 ELEVATED ALT MEASUREMENT: ICD-10-CM

## 2024-08-08 LAB
25(OH)D3 SERPL-MCNC: 18 NG/ML (ref 30–100)
ALBUMIN SERPL BCP-MCNC: 4.1 G/DL (ref 3.4–5)
ALP SERPL-CCNC: 131 U/L (ref 33–110)
ALT SERPL W P-5'-P-CCNC: 31 U/L (ref 7–45)
ANION GAP SERPL CALC-SCNC: 12 MMOL/L (ref 10–20)
AST SERPL W P-5'-P-CCNC: 21 U/L (ref 9–39)
BASOPHILS # BLD AUTO: 0.1 X10*3/UL (ref 0–0.1)
BASOPHILS NFR BLD AUTO: 0.8 %
BILIRUB SERPL-MCNC: 0.3 MG/DL (ref 0–1.2)
BUN SERPL-MCNC: 15 MG/DL (ref 6–23)
CALCIUM SERPL-MCNC: 8.9 MG/DL (ref 8.6–10.3)
CHLORIDE SERPL-SCNC: 102 MMOL/L (ref 98–107)
CO2 SERPL-SCNC: 26 MMOL/L (ref 21–32)
CREAT SERPL-MCNC: 0.69 MG/DL (ref 0.5–1.05)
EGFRCR SERPLBLD CKD-EPI 2021: >90 ML/MIN/1.73M*2
EOSINOPHIL # BLD AUTO: 0.41 X10*3/UL (ref 0–0.7)
EOSINOPHIL NFR BLD AUTO: 3.2 %
ERYTHROCYTE [DISTWIDTH] IN BLOOD BY AUTOMATED COUNT: 14.9 % (ref 11.5–14.5)
GLUCOSE SERPL-MCNC: 90 MG/DL (ref 74–99)
HCT VFR BLD AUTO: 41.9 % (ref 36–46)
HGB BLD-MCNC: 12.4 G/DL (ref 12–16)
IMM GRANULOCYTES # BLD AUTO: 0.04 X10*3/UL (ref 0–0.7)
IMM GRANULOCYTES NFR BLD AUTO: 0.3 % (ref 0–0.9)
LYMPHOCYTES # BLD AUTO: 3.44 X10*3/UL (ref 1.2–4.8)
LYMPHOCYTES NFR BLD AUTO: 26.8 %
MCH RBC QN AUTO: 27.6 PG (ref 26–34)
MCHC RBC AUTO-ENTMCNC: 29.6 G/DL (ref 32–36)
MCV RBC AUTO: 93 FL (ref 80–100)
MONOCYTES # BLD AUTO: 1.07 X10*3/UL (ref 0.1–1)
MONOCYTES NFR BLD AUTO: 8.3 %
NEUTROPHILS # BLD AUTO: 7.76 X10*3/UL (ref 1.2–7.7)
NEUTROPHILS NFR BLD AUTO: 60.6 %
NRBC BLD-RTO: 0 /100 WBCS (ref 0–0)
PLATELET # BLD AUTO: 450 X10*3/UL (ref 150–450)
POTASSIUM SERPL-SCNC: 4.3 MMOL/L (ref 3.5–5.3)
PROT SERPL-MCNC: 7.3 G/DL (ref 6.4–8.2)
RBC # BLD AUTO: 4.5 X10*6/UL (ref 4–5.2)
SODIUM SERPL-SCNC: 136 MMOL/L (ref 136–145)
TSH SERPL-ACNC: 2.91 MIU/L (ref 0.44–3.98)
WBC # BLD AUTO: 12.8 X10*3/UL (ref 4.4–11.3)

## 2024-08-08 PROCEDURE — 82306 VITAMIN D 25 HYDROXY: CPT

## 2024-08-08 PROCEDURE — 84443 ASSAY THYROID STIM HORMONE: CPT

## 2024-08-08 PROCEDURE — 85025 COMPLETE CBC W/AUTO DIFF WBC: CPT

## 2024-08-08 PROCEDURE — 80053 COMPREHEN METABOLIC PANEL: CPT

## 2024-08-08 PROCEDURE — 36415 COLL VENOUS BLD VENIPUNCTURE: CPT

## 2024-08-08 PROCEDURE — 83036 HEMOGLOBIN GLYCOSYLATED A1C: CPT

## 2024-08-09 LAB
EST. AVERAGE GLUCOSE BLD GHB EST-MCNC: 114 MG/DL
HBA1C MFR BLD: 5.6 %

## 2024-08-26 ENCOUNTER — APPOINTMENT (OUTPATIENT)
Dept: PRIMARY CARE | Facility: CLINIC | Age: 34
End: 2024-08-26
Payer: COMMERCIAL

## 2024-08-26 VITALS
HEIGHT: 62 IN | HEART RATE: 88 BPM | DIASTOLIC BLOOD PRESSURE: 80 MMHG | BODY MASS INDEX: 50.97 KG/M2 | SYSTOLIC BLOOD PRESSURE: 132 MMHG | WEIGHT: 277 LBS

## 2024-08-26 DIAGNOSIS — E78.2 MIXED HYPERLIPIDEMIA: ICD-10-CM

## 2024-08-26 DIAGNOSIS — J45.40 MODERATE PERSISTENT ASTHMA WITHOUT COMPLICATION (HHS-HCC): ICD-10-CM

## 2024-08-26 DIAGNOSIS — R06.09 DYSPNEA ON EXERTION: ICD-10-CM

## 2024-08-26 DIAGNOSIS — Z00.00 MEDICARE ANNUAL WELLNESS VISIT, SUBSEQUENT: Primary | ICD-10-CM

## 2024-08-26 DIAGNOSIS — R74.8 ELEVATED ALKALINE PHOSPHATASE LEVEL: ICD-10-CM

## 2024-08-26 DIAGNOSIS — R73.03 PREDIABETES: ICD-10-CM

## 2024-08-26 DIAGNOSIS — E06.3 HYPOTHYROIDISM DUE TO HASHIMOTO'S THYROIDITIS: ICD-10-CM

## 2024-08-26 DIAGNOSIS — E55.9 VITAMIN D DEFICIENCY: ICD-10-CM

## 2024-08-26 DIAGNOSIS — E66.01 CLASS 3 SEVERE OBESITY DUE TO EXCESS CALORIES WITH SERIOUS COMORBIDITY AND BODY MASS INDEX (BMI) OF 45.0 TO 49.9 IN ADULT (MULTI): ICD-10-CM

## 2024-08-26 DIAGNOSIS — E03.8 HYPOTHYROIDISM DUE TO HASHIMOTO'S THYROIDITIS: ICD-10-CM

## 2024-08-26 DIAGNOSIS — R03.0 ELEVATED BLOOD PRESSURE READING: ICD-10-CM

## 2024-08-26 DIAGNOSIS — R79.89 ABNORMAL CBC: ICD-10-CM

## 2024-08-26 DIAGNOSIS — G44.209 ACUTE NON INTRACTABLE TENSION-TYPE HEADACHE: ICD-10-CM

## 2024-08-26 PROCEDURE — G0439 PPPS, SUBSEQ VISIT: HCPCS | Performed by: NURSE PRACTITIONER

## 2024-08-26 PROCEDURE — 3008F BODY MASS INDEX DOCD: CPT | Performed by: NURSE PRACTITIONER

## 2024-08-26 PROCEDURE — G0444 DEPRESSION SCREEN ANNUAL: HCPCS | Performed by: NURSE PRACTITIONER

## 2024-08-26 PROCEDURE — 1123F ACP DISCUSS/DSCN MKR DOCD: CPT | Performed by: NURSE PRACTITIONER

## 2024-08-26 PROCEDURE — 99214 OFFICE O/P EST MOD 30 MIN: CPT | Performed by: NURSE PRACTITIONER

## 2024-08-26 RX ORDER — LEVOTHYROXINE SODIUM 125 UG/1
125 TABLET ORAL DAILY
Qty: 90 TABLET | Refills: 0 | Status: SHIPPED | OUTPATIENT
Start: 2024-08-26 | End: 2024-08-26 | Stop reason: SDUPTHER

## 2024-08-26 RX ORDER — ERGOCALCIFEROL 1.25 MG/1
50000 CAPSULE ORAL
Qty: 4 CAPSULE | Refills: 5 | Status: SHIPPED | OUTPATIENT
Start: 2024-08-26

## 2024-08-26 RX ORDER — METOPROLOL SUCCINATE 25 MG/1
25 TABLET, EXTENDED RELEASE ORAL DAILY
Qty: 90 TABLET | Refills: 0 | Status: SHIPPED | OUTPATIENT
Start: 2024-08-26

## 2024-08-26 RX ORDER — FAMOTIDINE 40 MG/1
40 TABLET, FILM COATED ORAL DAILY
Qty: 30 TABLET | Refills: 5 | Status: SHIPPED | OUTPATIENT
Start: 2024-08-26

## 2024-08-26 RX ORDER — ACETAMINOPHEN 325 MG/1
650 TABLET ORAL EVERY 6 HOURS PRN
Qty: 30 TABLET | Refills: 0 | Status: SHIPPED | OUTPATIENT
Start: 2024-08-26

## 2024-08-26 RX ORDER — FOLIC ACID 1 MG/1
1 TABLET ORAL
COMMUNITY
Start: 2024-08-14 | End: 2025-08-14

## 2024-08-26 RX ORDER — ATORVASTATIN CALCIUM 20 MG/1
20 TABLET, FILM COATED ORAL
Qty: 30 TABLET | Refills: 5 | Status: SHIPPED | OUTPATIENT
Start: 2024-08-26

## 2024-08-26 RX ORDER — LEVOTHYROXINE SODIUM 125 UG/1
125 TABLET ORAL DAILY
Qty: 90 TABLET | Refills: 0 | Status: SHIPPED | OUTPATIENT
Start: 2024-08-26

## 2024-08-26 ASSESSMENT — ENCOUNTER SYMPTOMS
LOSS OF SENSATION IN FEET: 0
HEADACHES: 1
OCCASIONAL FEELINGS OF UNSTEADINESS: 0
DEPRESSION: 1

## 2024-08-26 ASSESSMENT — ACTIVITIES OF DAILY LIVING (ADL)
TAKING_MEDICATION: NEEDS ASSISTANCE
GROCERY_SHOPPING: NEEDS ASSISTANCE
BATHING: INDEPENDENT
DRESSING: INDEPENDENT
DOING_HOUSEWORK: INDEPENDENT
MANAGING_FINANCES: NEEDS ASSISTANCE

## 2024-08-26 ASSESSMENT — PATIENT HEALTH QUESTIONNAIRE - PHQ9
1. LITTLE INTEREST OR PLEASURE IN DOING THINGS: NOT AT ALL
SUM OF ALL RESPONSES TO PHQ9 QUESTIONS 1 AND 2: 0
2. FEELING DOWN, DEPRESSED OR HOPELESS: NOT AT ALL

## 2024-08-26 NOTE — PATIENT INSTRUCTIONS
[facility] surgery: Dr. Jaden Vasquez. call phone # [phone] to schedule an appointment for colonoscopy.

## 2024-08-26 NOTE — PROGRESS NOTES
"Subjective   Reason for Visit: Brittany Shaver is an 34 y.o. female here for a Medicare Wellness visit.     Past Medical, Surgical, and Family History reviewed and updated in chart.         SAM Buenrostro returns for [facility] exam, lab review, and chronic care. She still has concerns of increased shortness of breath and is having a headache today.     Short of breath: notices when she walks distances, sometimes she will get short of breath when she is just sitting still. She reports her neurologist told her she should get her \"breathing and heart checked\".     Headache: started this morning, gotten worse this afternoon. mainly in her temples. Feels it is from \"all my jerking I do\".  Has not taken anything for it yet today.     Prediabetes: back to normal range. A1c is 5.6%    Vitamin D def: continued on vitamin D supplement. Level 18    Hypothyroid: continue on levothyroxine at current dose.  TSH 2.91    Pap: 2/6/24  Mammogram: 3/20/24  Colon CA screen: start age 45    Patient Care Team:  AJ Bhakta-CNP as PCP - General (Family Medicine)     Review of Systems   Constitutional:  Negative for fatigue.   HENT: Negative.     Eyes:  Positive for itching (feels like eyes are dry).   Respiratory:  Positive for shortness of breath. Negative for cough and chest tightness.    Cardiovascular:  Negative for chest pain, palpitations and leg swelling.   Gastrointestinal:  Negative for abdominal pain, blood in stool, constipation, diarrhea, nausea and vomiting.   Genitourinary:  Negative for dysuria.   Musculoskeletal:  Negative for arthralgias and myalgias.   Skin:  Negative for color change.   Neurological:  Positive for headaches. Negative for dizziness and light-headedness.   Psychiatric/Behavioral:  Positive for decreased concentration. Negative for self-injury, sleep disturbance and suicidal ideas. The patient is nervous/anxious and is hyperactive.        Objective   Vitals:  /80   Pulse 88   Ht 1.575 m (5' 2\")   Wt " 126 kg (277 lb)   BMI 50.66 kg/m²       Physical Exam  Constitutional:       Appearance: Normal appearance.   HENT:      Head: Normocephalic and atraumatic.   Cardiovascular:      Rate and Rhythm: Normal rate and regular rhythm.      Pulses: Normal pulses.      Heart sounds: Normal heart sounds.   Pulmonary:      Effort: Pulmonary effort is normal.      Breath sounds: Normal breath sounds.   Abdominal:      General: Bowel sounds are normal.      Palpations: Abdomen is soft.   Musculoskeletal:      Cervical back: Normal range of motion.   Skin:     General: Skin is warm and dry.   Neurological:      General: No focal deficit present.      Mental Status: She is alert and oriented to person, place, and time.   Psychiatric:         Attention and Perception: Attention normal.         Mood and Affect: Mood is anxious.         Speech: Speech is tangential.         Behavior: Behavior is hyperactive.         Thought Content: Thought content normal.         Cognition and Memory: Cognition normal.         Judgment: Judgment normal.         Assessment/Plan   Problem List Items Addressed This Visit             ICD-10-CM    Hyperlipidemia E78.5    Relevant Medications    atorvastatin (Lipitor) 20 mg tablet    Moderate persistent asthma without complication ([facility]) J45.40    Relevant Medications    famotidine (Pepcid) 40 mg tablet    Vitamin D deficiency E55.9     8/8/24: vitamin D level 18  Continue vitamin D 50,000 units weekly         Relevant Medications    ergocalciferol (Vitamin D-2) 1.25 MG (08083 UT) capsule    Other Relevant Orders    Vitamin D 25-Hydroxy,Total (for eval of Vitamin D levels)    Hypothyroidism due to Hashimoto's thyroiditis E03.8, E06.3     8/8/24: TSH 2.91  Controlled  Levothyroxine 125 mcg daily         Relevant Medications    levothyroxine (Synthroid, Levoxyl) 125 mcg tablet    Class 3 severe obesity due to excess calories with serious comorbidity and body mass index (BMI) of 45.0 to 49.9 in adult  (Multi) E66.01, Z68.42    Prediabetes R73.03     8/8/24: A1c 5.6%         Elevated blood pressure reading R03.0    Relevant Medications    metoprolol succinate XL (Toprol-XL) 25 mg 24 hr tablet     Other Visit Diagnoses         Codes    Medicare annual wellness visit, subsequent    -  Primary Z00.00    Dyspnea on exertion     R06.09    Relevant Orders    Transthoracic Echo Complete    Complete Pulmonary Function Test Pre/Post Bronchodialator (Spirometry Pre/Post/DLCO/Lung Volumes)    Acute non intractable tension-type headache     G44.209    Relevant Medications    acetaminophen (TylenoL) 325 mg tablet    Elevated alkaline phosphatase level     R74.8    Relevant Orders    Gamma GT    Hepatic function panel    Abnormal CBC     R79.89    Relevant Orders    CBC and Auto Differential          Depression Screening  5 - 10 minutes were spent screening for depression.     Follow up in 3 months for chronic care, she will get lab work prior to appointment.    For any new medications that were prescribed today, the patient was educated about their indications for use, administration, frequency and potential side effects of the medication.

## 2024-08-29 ASSESSMENT — ENCOUNTER SYMPTOMS
PALPITATIONS: 0
CHEST TIGHTNESS: 0
DECREASED CONCENTRATION: 1
DIARRHEA: 0
DYSURIA: 0
FATIGUE: 0
CONSTIPATION: 0
ARTHRALGIAS: 0
BLOOD IN STOOL: 0
ABDOMINAL PAIN: 0
MYALGIAS: 0
COUGH: 0
HYPERACTIVE: 1
EYE ITCHING: 1
NAUSEA: 0
SHORTNESS OF BREATH: 1
VOMITING: 0
NERVOUS/ANXIOUS: 1
LIGHT-HEADEDNESS: 0
SLEEP DISTURBANCE: 0
DIZZINESS: 0
COLOR CHANGE: 0

## 2024-08-29 NOTE — PROGRESS NOTES
FAXED ECHO OVER TO [facility] TO CALL PATIENT TO  SCHEDULE ECHO. NO PRIOR AUTH NEEDED. ALSO FAXED PFT OVER TO [facility] TO CALL PATIENT TO SCHEDULE -TT

## 2024-09-04 ENCOUNTER — TELEPHONE (OUTPATIENT)
Dept: PRIMARY CARE | Facility: CLINIC | Age: 34
End: 2024-09-04

## 2024-09-04 NOTE — TELEPHONE ENCOUNTER
Brittany stopped in the office, she is wanting to know if you got the fax, [facility] is requesting a medication for something to relax her during the MRI. [facility] doesn't want something that will put her to sleep. Her MRI is scheduled for Friday.

## 2024-09-11 ENCOUNTER — APPOINTMENT (OUTPATIENT)
Dept: NEUROLOGY | Facility: CLINIC | Age: 34
End: 2024-09-11
Payer: COMMERCIAL

## 2024-09-16 ENCOUNTER — APPOINTMENT (OUTPATIENT)
Dept: PRIMARY CARE | Facility: CLINIC | Age: 34
End: 2024-09-16
Payer: COMMERCIAL

## 2024-09-23 ENCOUNTER — TELEPHONE (OUTPATIENT)
Dept: PRIMARY CARE | Facility: CLINIC | Age: 34
End: 2024-09-23
Payer: COMMERCIAL

## 2024-09-23 NOTE — TELEPHONE ENCOUNTER
Brittany stopped in the office today and said that she was not able to do her MRI today due to her panicking. She had it rescheduled for 10/10. She was given Diazepam by her neurologist to help with the nerves, but wanted to check with you to make sure it was okay to take it before her next MRI.

## 2024-10-07 ENCOUNTER — APPOINTMENT (OUTPATIENT)
Dept: PRIMARY CARE | Facility: CLINIC | Age: 34
End: 2024-10-07
Payer: COMMERCIAL

## 2024-10-07 VITALS
DIASTOLIC BLOOD PRESSURE: 80 MMHG | SYSTOLIC BLOOD PRESSURE: 138 MMHG | HEIGHT: 62 IN | BODY MASS INDEX: 50.14 KG/M2 | HEART RATE: 83 BPM | WEIGHT: 272.5 LBS

## 2024-10-07 DIAGNOSIS — Z09 ENCOUNTER FOR EXAMINATION FOLLOWING TREATMENT AT HOSPITAL: ICD-10-CM

## 2024-10-07 DIAGNOSIS — L21.0 DANDRUFF: ICD-10-CM

## 2024-10-07 DIAGNOSIS — E66.813 CLASS 3 SEVERE OBESITY DUE TO EXCESS CALORIES WITH SERIOUS COMORBIDITY AND BODY MASS INDEX (BMI) OF 45.0 TO 49.9 IN ADULT: ICD-10-CM

## 2024-10-07 DIAGNOSIS — R00.0 RACING HEART BEAT: Primary | ICD-10-CM

## 2024-10-07 DIAGNOSIS — E66.01 CLASS 3 SEVERE OBESITY DUE TO EXCESS CALORIES WITH SERIOUS COMORBIDITY AND BODY MASS INDEX (BMI) OF 45.0 TO 49.9 IN ADULT: ICD-10-CM

## 2024-10-07 DIAGNOSIS — R42 VERTIGO: ICD-10-CM

## 2024-10-07 DIAGNOSIS — J45.40 MODERATE PERSISTENT ASTHMA WITHOUT COMPLICATION (HHS-HCC): ICD-10-CM

## 2024-10-07 PROCEDURE — 1036F TOBACCO NON-USER: CPT | Performed by: NURSE PRACTITIONER

## 2024-10-07 PROCEDURE — 99214 OFFICE O/P EST MOD 30 MIN: CPT | Performed by: NURSE PRACTITIONER

## 2024-10-07 PROCEDURE — 3008F BODY MASS INDEX DOCD: CPT | Performed by: NURSE PRACTITIONER

## 2024-10-07 RX ORDER — ALBUTEROL SULFATE 90 UG/1
2 INHALANT RESPIRATORY (INHALATION) EVERY 6 HOURS PRN
Qty: 18 G | Refills: 2 | Status: SHIPPED | OUTPATIENT
Start: 2024-10-07 | End: 2025-10-07

## 2024-10-07 RX ORDER — CLOBETASOL PROPIONATE 0.5 MG/ML
SOLUTION TOPICAL
Qty: 60 ML | Refills: 0 | Status: SHIPPED | OUTPATIENT
Start: 2024-10-07

## 2024-10-07 ASSESSMENT — ENCOUNTER SYMPTOMS
DIZZINESS: 1
MYALGIAS: 1
CONSTITUTIONAL NEGATIVE: 1
NERVOUS/ANXIOUS: 1
BACK PAIN: 1
NAUSEA: 1
ROS SKIN COMMENTS: SCALP DANDRUFF
ARTHRALGIAS: 0
PALPITATIONS: 1
HEMATOLOGIC/LYMPHATIC NEGATIVE: 1
SHORTNESS OF BREATH: 0
CHEST TIGHTNESS: 0
SLEEP DISTURBANCE: 0
LIGHT-HEADEDNESS: 1
RESPIRATORY NEGATIVE: 1
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
## 2024-10-07 NOTE — PATIENT INSTRUCTIONS
[facility]   Phone #[phone] [address]   [facility] [address]

## 2024-11-06 ENCOUNTER — OFFICE VISIT (OUTPATIENT)
Age: 34
End: 2024-11-06
Payer: COMMERCIAL

## 2024-11-06 VITALS
OXYGEN SATURATION: 94 % | BODY MASS INDEX: 51.73 KG/M2 | DIASTOLIC BLOOD PRESSURE: 80 MMHG | WEIGHT: 281.13 LBS | HEART RATE: 94 BPM | SYSTOLIC BLOOD PRESSURE: 132 MMHG | HEIGHT: 62 IN

## 2024-11-06 DIAGNOSIS — J30.2 SEASONAL ALLERGIES: ICD-10-CM

## 2024-11-06 DIAGNOSIS — E06.3 HYPOTHYROIDISM DUE TO HASHIMOTO'S THYROIDITIS: ICD-10-CM

## 2024-11-06 DIAGNOSIS — E66.01 CLASS 3 SEVERE OBESITY DUE TO EXCESS CALORIES WITH SERIOUS COMORBIDITY AND BODY MASS INDEX (BMI) OF 50.0 TO 59.9 IN ADULT: ICD-10-CM

## 2024-11-06 DIAGNOSIS — R03.0 ELEVATED BLOOD PRESSURE READING: Primary | ICD-10-CM

## 2024-11-06 DIAGNOSIS — E66.813 CLASS 3 SEVERE OBESITY DUE TO EXCESS CALORIES WITH SERIOUS COMORBIDITY AND BODY MASS INDEX (BMI) OF 50.0 TO 59.9 IN ADULT: ICD-10-CM

## 2024-11-06 DIAGNOSIS — J45.40 MODERATE PERSISTENT ASTHMA WITHOUT COMPLICATION (HHS-HCC): ICD-10-CM

## 2024-11-06 PROBLEM — G24.01 DRUG INDUCED SUBACUTE DYSKINESIA: Status: ACTIVE | Noted: 2024-10-30

## 2024-11-06 PROCEDURE — G2211 COMPLEX E/M VISIT ADD ON: HCPCS | Performed by: NURSE PRACTITIONER

## 2024-11-06 PROCEDURE — 3008F BODY MASS INDEX DOCD: CPT | Performed by: NURSE PRACTITIONER

## 2024-11-06 PROCEDURE — 99214 OFFICE O/P EST MOD 30 MIN: CPT | Performed by: NURSE PRACTITIONER

## 2024-11-06 RX ORDER — LEVOTHYROXINE SODIUM 125 UG/1
125 TABLET ORAL DAILY
Qty: 30 TABLET | Refills: 1 | Status: SHIPPED | OUTPATIENT
Start: 2024-11-06

## 2024-11-06 RX ORDER — FLUTICASONE PROPIONATE AND SALMETEROL XINAFOATE 115; 21 UG/1; UG/1
2 AEROSOL, METERED RESPIRATORY (INHALATION)
Qty: 12 G | Refills: 5 | Status: SHIPPED | OUTPATIENT
Start: 2024-11-06 | End: 2025-11-06

## 2024-11-06 RX ORDER — LORATADINE 10 MG/1
10 TABLET ORAL DAILY
Qty: 30 TABLET | Refills: 5 | Status: SHIPPED | OUTPATIENT
Start: 2024-11-06

## 2024-11-06 RX ORDER — METOPROLOL SUCCINATE 25 MG/1
25 TABLET, EXTENDED RELEASE ORAL DAILY
Qty: 30 TABLET | Refills: 5 | Status: SHIPPED | OUTPATIENT
Start: 2024-11-06

## 2024-11-06 NOTE — PROGRESS NOTES
"Subjective   Patient ID: Brittany Shaver is a 34 y.o. female who presents for Shortness of Breath.    SAM Buenrostro returns with her  for concerns of shortness of breath that continues.    She has had a PFT test done-which shows mild asthma-which she is being treated.  We did an Echo which was normal, we have not gotten the results of her holter monitor back yet.     She also has concerns of constant \"clear runny nose\". Cannot use nasal sprays. \"Doesn't like how they make my nose feel.\" Is not currently on an allergy medication.     She reports that she will be seeing neurology for her tardive dyskinesia due to her psychiatric medications that she is on.       Review of Systems   Constitutional:  Negative for fatigue.   Eyes:         Eye twitching   Respiratory:  Positive for shortness of breath (possible may be r/to her dyskinesia). Negative for chest tightness.    Cardiovascular:  Negative for chest pain, palpitations and leg swelling.   Gastrointestinal:  Negative for abdominal pain, blood in stool, constipation, diarrhea, nausea and vomiting.   Genitourinary:  Negative for dysuria.   Musculoskeletal:  Negative for arthralgias and myalgias.   Skin:  Negative for color change.   Neurological:  Negative for dizziness, light-headedness and headaches.        Eye twitching, lip smacking, restlessness--related to her dyskinesia   Psychiatric/Behavioral:  Negative for dysphoric mood, self-injury, sleep disturbance and suicidal ideas. The patient is nervous/anxious.        Objective   /80   Pulse 94   Ht 1.575 m (5' 2\")   Wt 128 kg (281 lb 2 oz)   SpO2 94%   BMI 51.42 kg/m²     Physical Exam  Vitals and nursing note reviewed.   Constitutional:       Appearance: Normal appearance.   HENT:      Head: Normocephalic and atraumatic.   Cardiovascular:      Rate and Rhythm: Normal rate and regular rhythm.      Pulses: Normal pulses.      Heart sounds: Normal heart sounds.   Pulmonary:      Effort: Pulmonary " effort is normal.      Breath sounds: Normal breath sounds.   Abdominal:      General: Bowel sounds are normal.      Palpations: Abdomen is soft.   Musculoskeletal:      Cervical back: Normal range of motion.   Skin:     General: Skin is warm and dry.   Neurological:      Mental Status: She is alert and oriented to person, place, and time. Mental status is at baseline.   Psychiatric:         Mood and Affect: Mood normal.         Behavior: Behavior normal.         Thought Content: Thought content normal.         Judgment: Judgment normal.         Assessment/Plan   Problem List Items Addressed This Visit             ICD-10-CM    Moderate persistent asthma without complication ([facility]) J45.40     Albuterol as needed  Advair inhaler - refilled         Relevant Medications    fluticasone propion-salmeteroL (Advair HFA) 115-21 mcg/actuation inhaler    Hypothyroidism due to Hashimoto's thyroiditis E06.3     8/8/24: TSH 2.91  Controlled  Levothyroxine 125 mcg daily         Relevant Medications    levothyroxine (Synthroid, Levoxyl) 125 mcg tablet    Class 3 severe obesity due to excess calories with serious comorbidity and body mass index (BMI) of 50.0 to 59.9 in adult E66.813, Z68.43, E66.01    Elevated blood pressure reading - Primary R03.0    Relevant Medications    metoprolol succinate XL (Toprol-XL) 25 mg 24 hr tablet    Seasonal allergies J30.2     Start claritin 10 mg daily         Relevant Medications    loratadine (Claritin) 10 mg tablet        Follow up as scheduled in January for [facility] exam. Labs prior to appointment.     For any new medications that were prescribed today, the patient was educated about their indications for use, administration, frequency and potential side effects of the medication.

## 2024-11-10 PROBLEM — J30.2 SEASONAL ALLERGIES: Status: ACTIVE | Noted: 2024-11-10

## 2024-11-10 ASSESSMENT — ENCOUNTER SYMPTOMS
VOMITING: 0
DIARRHEA: 0
DIZZINESS: 0
ABDOMINAL PAIN: 0
FATIGUE: 0
HEADACHES: 0
DYSPHORIC MOOD: 0
CONSTIPATION: 0
LIGHT-HEADEDNESS: 0
NAUSEA: 0
SLEEP DISTURBANCE: 0
COLOR CHANGE: 0
PALPITATIONS: 0
SHORTNESS OF BREATH: 1
ARTHRALGIAS: 0
CHEST TIGHTNESS: 0
NERVOUS/ANXIOUS: 1
DYSURIA: 0
MYALGIAS: 0
BLOOD IN STOOL: 0

## 2024-12-04 ENCOUNTER — LAB (OUTPATIENT)
Dept: LAB | Facility: LAB | Age: 34
End: 2024-12-04
Payer: COMMERCIAL

## 2024-12-04 DIAGNOSIS — E55.9 VITAMIN D DEFICIENCY: ICD-10-CM

## 2024-12-04 DIAGNOSIS — R79.89 ABNORMAL CBC: ICD-10-CM

## 2024-12-04 DIAGNOSIS — R74.8 ELEVATED ALKALINE PHOSPHATASE LEVEL: ICD-10-CM

## 2024-12-04 LAB
25(OH)D3 SERPL-MCNC: 19 NG/ML (ref 30–100)
ALBUMIN SERPL BCP-MCNC: 4.1 G/DL (ref 3.4–5)
ALP SERPL-CCNC: 103 U/L (ref 33–110)
ALT SERPL W P-5'-P-CCNC: 35 U/L (ref 7–45)
AST SERPL W P-5'-P-CCNC: 22 U/L (ref 9–39)
BASOPHILS # BLD AUTO: 0.07 X10*3/UL (ref 0–0.1)
BASOPHILS NFR BLD AUTO: 0.5 %
BILIRUB DIRECT SERPL-MCNC: 0.1 MG/DL (ref 0–0.3)
BILIRUB SERPL-MCNC: 0.3 MG/DL (ref 0–1.2)
EOSINOPHIL # BLD AUTO: 0.33 X10*3/UL (ref 0–0.7)
EOSINOPHIL NFR BLD AUTO: 2.5 %
ERYTHROCYTE [DISTWIDTH] IN BLOOD BY AUTOMATED COUNT: 15 % (ref 11.5–14.5)
GGT SERPL-CCNC: 28 U/L (ref 5–55)
HCT VFR BLD AUTO: 40.8 % (ref 36–46)
HGB BLD-MCNC: 12.5 G/DL (ref 12–16)
IMM GRANULOCYTES # BLD AUTO: 0.06 X10*3/UL (ref 0–0.7)
IMM GRANULOCYTES NFR BLD AUTO: 0.5 % (ref 0–0.9)
LYMPHOCYTES # BLD AUTO: 3.5 X10*3/UL (ref 1.2–4.8)
LYMPHOCYTES NFR BLD AUTO: 26.9 %
MCH RBC QN AUTO: 27.2 PG (ref 26–34)
MCHC RBC AUTO-ENTMCNC: 30.6 G/DL (ref 32–36)
MCV RBC AUTO: 89 FL (ref 80–100)
MONOCYTES # BLD AUTO: 0.92 X10*3/UL (ref 0.1–1)
MONOCYTES NFR BLD AUTO: 7.1 %
NEUTROPHILS # BLD AUTO: 8.13 X10*3/UL (ref 1.2–7.7)
NEUTROPHILS NFR BLD AUTO: 62.5 %
NRBC BLD-RTO: 0 /100 WBCS (ref 0–0)
PLATELET # BLD AUTO: 464 X10*3/UL (ref 150–450)
PROT SERPL-MCNC: 6.8 G/DL (ref 6.4–8.2)
RBC # BLD AUTO: 4.6 X10*6/UL (ref 4–5.2)
WBC # BLD AUTO: 13 X10*3/UL (ref 4.4–11.3)

## 2024-12-04 PROCEDURE — 80076 HEPATIC FUNCTION PANEL: CPT

## 2024-12-04 PROCEDURE — 85025 COMPLETE CBC W/AUTO DIFF WBC: CPT

## 2024-12-04 PROCEDURE — 82977 ASSAY OF GGT: CPT

## 2024-12-04 PROCEDURE — 36415 COLL VENOUS BLD VENIPUNCTURE: CPT

## 2024-12-04 PROCEDURE — 82306 VITAMIN D 25 HYDROXY: CPT

## 2024-12-05 DIAGNOSIS — R79.89 ABNORMAL CBC MEASUREMENT: Primary | ICD-10-CM

## 2024-12-05 NOTE — RESULT ENCOUNTER NOTE
Let her know her vitamin D is still low at 19, continue taking vitamin d supplement.   Also her blood counts are abnormal again. Can you please ask her if she has felt unwell around the time of her lab work?    We will need to repeat her blood counts  again in 2-3 weeks.  Unless she is not feeling well-she will need to wait until she is feeling better.

## 2024-12-11 ENCOUNTER — TELEPHONE (OUTPATIENT)
Age: 34
End: 2024-12-11

## 2024-12-11 NOTE — TELEPHONE ENCOUNTER
After the first of the year her insurance will no longer be covering the albuterol     That will have to be changed to either Symbicort or Wixela     Thanks

## 2024-12-16 ENCOUNTER — LAB (OUTPATIENT)
Dept: LAB | Facility: LAB | Age: 34
End: 2024-12-16
Payer: COMMERCIAL

## 2024-12-16 DIAGNOSIS — R79.89 ABNORMAL CBC MEASUREMENT: ICD-10-CM

## 2024-12-16 LAB
BASOPHILS # BLD AUTO: 0.09 X10*3/UL (ref 0–0.1)
BASOPHILS NFR BLD AUTO: 0.7 %
EOSINOPHIL # BLD AUTO: 0.35 X10*3/UL (ref 0–0.7)
EOSINOPHIL NFR BLD AUTO: 2.9 %
ERYTHROCYTE [DISTWIDTH] IN BLOOD BY AUTOMATED COUNT: 14.4 % (ref 11.5–14.5)
HCT VFR BLD AUTO: 43.3 % (ref 36–46)
HGB BLD-MCNC: 13 G/DL (ref 12–16)
IMM GRANULOCYTES # BLD AUTO: 0.06 X10*3/UL (ref 0–0.7)
IMM GRANULOCYTES NFR BLD AUTO: 0.5 % (ref 0–0.9)
LYMPHOCYTES # BLD AUTO: 3.23 X10*3/UL (ref 1.2–4.8)
LYMPHOCYTES NFR BLD AUTO: 26.5 %
MCH RBC QN AUTO: 26.6 PG (ref 26–34)
MCHC RBC AUTO-ENTMCNC: 30 G/DL (ref 32–36)
MCV RBC AUTO: 89 FL (ref 80–100)
MONOCYTES # BLD AUTO: 0.67 X10*3/UL (ref 0.1–1)
MONOCYTES NFR BLD AUTO: 5.5 %
NEUTROPHILS # BLD AUTO: 7.78 X10*3/UL (ref 1.2–7.7)
NEUTROPHILS NFR BLD AUTO: 63.9 %
NRBC BLD-RTO: 0 /100 WBCS (ref 0–0)
PLATELET # BLD AUTO: 579 X10*3/UL (ref 150–450)
RBC # BLD AUTO: 4.88 X10*6/UL (ref 4–5.2)
WBC # BLD AUTO: 12.2 X10*3/UL (ref 4.4–11.3)

## 2024-12-16 PROCEDURE — 36415 COLL VENOUS BLD VENIPUNCTURE: CPT

## 2024-12-16 PROCEDURE — 85025 COMPLETE CBC W/AUTO DIFF WBC: CPT

## 2024-12-17 DIAGNOSIS — D75.839 THROMBOCYTOSIS: Primary | ICD-10-CM

## 2025-01-13 ENCOUNTER — APPOINTMENT (OUTPATIENT)
Dept: PRIMARY CARE | Facility: CLINIC | Age: 35
End: 2025-01-13
Payer: COMMERCIAL

## 2025-01-20 DIAGNOSIS — E55.9 VITAMIN D DEFICIENCY: ICD-10-CM

## 2025-01-20 RX ORDER — ERGOCALCIFEROL 1.25 MG/1
50000 CAPSULE ORAL
Qty: 4 CAPSULE | Refills: 5 | Status: SHIPPED | OUTPATIENT
Start: 2025-01-20

## 2025-01-21 ENCOUNTER — APPOINTMENT (OUTPATIENT)
Age: 35
End: 2025-01-21
Payer: COMMERCIAL

## 2025-01-21 VITALS
WEIGHT: 285.19 LBS | DIASTOLIC BLOOD PRESSURE: 80 MMHG | HEIGHT: 62 IN | BODY MASS INDEX: 52.48 KG/M2 | SYSTOLIC BLOOD PRESSURE: 132 MMHG | HEART RATE: 72 BPM

## 2025-01-21 DIAGNOSIS — F20.9 SCHIZOPHRENIA, UNSPECIFIED TYPE: ICD-10-CM

## 2025-01-21 DIAGNOSIS — J44.89 COPD WITH ASTHMA (MULTI): ICD-10-CM

## 2025-01-21 DIAGNOSIS — K21.9 GASTROESOPHAGEAL REFLUX DISEASE WITHOUT ESOPHAGITIS: ICD-10-CM

## 2025-01-21 DIAGNOSIS — E66.01 CLASS 3 SEVERE OBESITY DUE TO EXCESS CALORIES WITH SERIOUS COMORBIDITY AND BODY MASS INDEX (BMI) OF 50.0 TO 59.9 IN ADULT: ICD-10-CM

## 2025-01-21 DIAGNOSIS — Z12.31 ENCOUNTER FOR SCREENING MAMMOGRAM FOR BREAST CANCER: ICD-10-CM

## 2025-01-21 DIAGNOSIS — E06.3 HYPOTHYROIDISM DUE TO HASHIMOTO'S THYROIDITIS: ICD-10-CM

## 2025-01-21 DIAGNOSIS — Z00.00 ROUTINE GENERAL MEDICAL EXAMINATION AT HEALTH CARE FACILITY: Primary | ICD-10-CM

## 2025-01-21 DIAGNOSIS — R03.0 ELEVATED BLOOD PRESSURE READING: ICD-10-CM

## 2025-01-21 DIAGNOSIS — E66.813 CLASS 3 SEVERE OBESITY DUE TO EXCESS CALORIES WITH SERIOUS COMORBIDITY AND BODY MASS INDEX (BMI) OF 50.0 TO 59.9 IN ADULT: ICD-10-CM

## 2025-01-21 DIAGNOSIS — E78.2 MIXED HYPERLIPIDEMIA: ICD-10-CM

## 2025-01-21 DIAGNOSIS — J45.40 MODERATE PERSISTENT ASTHMA WITHOUT COMPLICATION (HHS-HCC): ICD-10-CM

## 2025-01-21 DIAGNOSIS — F31.30 BIPOLAR I DISORDER, MOST RECENT EPISODE DEPRESSED (MULTI): ICD-10-CM

## 2025-01-21 DIAGNOSIS — R73.03 PREDIABETES: ICD-10-CM

## 2025-01-21 PROCEDURE — G0439 PPPS, SUBSEQ VISIT: HCPCS | Performed by: NURSE PRACTITIONER

## 2025-01-21 PROCEDURE — G0444 DEPRESSION SCREEN ANNUAL: HCPCS | Performed by: NURSE PRACTITIONER

## 2025-01-21 PROCEDURE — 1123F ACP DISCUSS/DSCN MKR DOCD: CPT | Performed by: NURSE PRACTITIONER

## 2025-01-21 PROCEDURE — 3008F BODY MASS INDEX DOCD: CPT | Performed by: NURSE PRACTITIONER

## 2025-01-21 PROCEDURE — 99214 OFFICE O/P EST MOD 30 MIN: CPT | Performed by: NURSE PRACTITIONER

## 2025-01-21 RX ORDER — VALBENAZINE 40 MG/1
80 CAPSULE ORAL DAILY
COMMUNITY

## 2025-01-21 RX ORDER — ALBUTEROL SULFATE 90 UG/1
2 INHALANT RESPIRATORY (INHALATION) EVERY 6 HOURS PRN
Qty: 18 G | Refills: 2 | Status: SHIPPED | OUTPATIENT
Start: 2025-01-21 | End: 2026-01-21

## 2025-01-21 RX ORDER — ATORVASTATIN CALCIUM 20 MG/1
20 TABLET, FILM COATED ORAL
Qty: 30 TABLET | Refills: 5 | Status: SHIPPED | OUTPATIENT
Start: 2025-01-21

## 2025-01-21 RX ORDER — LEVOTHYROXINE SODIUM 125 UG/1
125 TABLET ORAL DAILY
Qty: 30 TABLET | Refills: 1 | Status: SHIPPED | OUTPATIENT
Start: 2025-01-21

## 2025-01-21 RX ORDER — FAMOTIDINE 40 MG/1
40 TABLET, FILM COATED ORAL DAILY
Qty: 30 TABLET | Refills: 5 | Status: SHIPPED | OUTPATIENT
Start: 2025-01-21

## 2025-01-21 RX ORDER — METOPROLOL SUCCINATE 25 MG/1
25 TABLET, EXTENDED RELEASE ORAL DAILY
Qty: 30 TABLET | Refills: 5 | Status: SHIPPED | OUTPATIENT
Start: 2025-01-21

## 2025-01-21 ASSESSMENT — ENCOUNTER SYMPTOMS
ARTHRALGIAS: 0
COLOR CHANGE: 0
PALPITATIONS: 0
DIARRHEA: 0
DYSPHORIC MOOD: 0
ABDOMINAL PAIN: 0
DYSURIA: 0
HEADACHES: 0
MYALGIAS: 0
CONSTIPATION: 0
LIGHT-HEADEDNESS: 0
VOMITING: 0
OCCASIONAL FEELINGS OF UNSTEADINESS: 0
NAUSEA: 0
SLEEP DISTURBANCE: 0
DEPRESSION: 1
CHEST TIGHTNESS: 0
FATIGUE: 0
HYPERACTIVE: 1
SHORTNESS OF BREATH: 0
BLOOD IN STOOL: 0
LOSS OF SENSATION IN FEET: 0
DIZZINESS: 0
NERVOUS/ANXIOUS: 1
DECREASED CONCENTRATION: 0

## 2025-01-21 ASSESSMENT — ACTIVITIES OF DAILY LIVING (ADL)
DRESSING: INDEPENDENT
DOING_HOUSEWORK: INDEPENDENT
TAKING_MEDICATION: INDEPENDENT
BATHING: INDEPENDENT
GROCERY_SHOPPING: INDEPENDENT
MANAGING_FINANCES: INDEPENDENT

## 2025-01-21 ASSESSMENT — PATIENT HEALTH QUESTIONNAIRE - PHQ9
2. FEELING DOWN, DEPRESSED OR HOPELESS: NOT AT ALL
SUM OF ALL RESPONSES TO PHQ9 QUESTIONS 1 AND 2: 0
1. LITTLE INTEREST OR PLEASURE IN DOING THINGS: NOT AT ALL

## 2025-01-21 NOTE — ASSESSMENT & PLAN NOTE
Orders:    levothyroxine (Synthroid, Levoxyl) 125 mcg tablet; Take 1 tablet (125 mcg) by mouth early in the morning..    TSH with reflex to Free T4 if abnormal; Future

## 2025-01-21 NOTE — ASSESSMENT & PLAN NOTE
Orders:    inhalational spacing device inhaler; Used as directed with inhalers.    albuterol 90 mcg/actuation inhaler; Inhale 2 puffs every 6 hours if needed for shortness of breath or wheezing.

## 2025-01-21 NOTE — ASSESSMENT & PLAN NOTE
Orders:    metoprolol succinate XL (Toprol-XL) 25 mg 24 hr tablet; Take 1 tablet (25 mg) by mouth once daily. Do not crush or chew.

## 2025-01-21 NOTE — PROGRESS NOTES
"Subjective   Reason for Visit: Brittany Shaver is an 34 y.o. female here for a Medicare Wellness visit.     Past Medical, Surgical, and Family History reviewed and updated in chart.         SAM Buenrostro returns with her  for MCW exam, med refills,       Mammogram: due 3/20/25    Patient Care Team:  AJ Bhakta-CNP as PCP - General (Family Medicine)  Arian Moreland MD as Consulting Physician (Psychiatry)  Gladis Stewart DPM as Consulting Physician (Podiatry)  Jovita Krishnamurthy MD as Consulting Physician (Neurology)     Review of Systems   Constitutional:  Negative for fatigue.   Respiratory:  Negative for chest tightness and shortness of breath.    Cardiovascular:  Negative for chest pain, palpitations and leg swelling.   Gastrointestinal:  Negative for abdominal pain, blood in stool, constipation, diarrhea, nausea and vomiting.   Genitourinary:  Negative for dysuria.   Musculoskeletal:  Negative for arthralgias and myalgias.   Skin:  Negative for color change.   Neurological:  Negative for dizziness, light-headedness and headaches.   Psychiatric/Behavioral:  Negative for decreased concentration, dysphoric mood, sleep disturbance and suicidal ideas. The patient is nervous/anxious and is hyperactive.        Objective   Vitals:  /80   Pulse 72   Ht 1.575 m (5' 2\")   Wt 129 kg (285 lb 3 oz)   BMI 52.16 kg/m²       Physical Exam  Vitals and nursing note reviewed. Exam conducted with a chaperone present.   Constitutional:       Appearance: Normal appearance.   HENT:      Head: Normocephalic and atraumatic.   Cardiovascular:      Rate and Rhythm: Normal rate and regular rhythm.      Pulses: Normal pulses.      Heart sounds: Normal heart sounds.   Pulmonary:      Effort: Pulmonary effort is normal.      Breath sounds: Wheezing present.   Abdominal:      General: Bowel sounds are normal.      Palpations: Abdomen is soft.   Musculoskeletal:         General: Normal range of motion.      " "Cervical back: Normal range of motion and neck supple.   Skin:     General: Skin is warm and dry.   Neurological:      General: No focal deficit present.      Mental Status: She is alert and oriented to person, place, and time. Mental status is at baseline.   Psychiatric:         Mood and Affect: Mood normal.         Behavior: Behavior normal.         Thought Content: Thought content normal.         Judgment: Judgment normal.       Assessment & Plan  Routine general medical examination at health care facility         Mixed hyperlipidemia    Orders:    atorvastatin (Lipitor) 20 mg tablet; Take 1 tablet (20 mg) by mouth once daily.    Lipid Panel; Future    Moderate persistent asthma without complication ([facility])    Orders:    inhalational spacing device inhaler; Used as directed with inhalers.    albuterol 90 mcg/actuation inhaler; Inhale 2 puffs every 6 hours if needed for shortness of breath or wheezing.    Hypothyroidism due to Hashimoto's thyroiditis    Orders:    levothyroxine (Synthroid, Levoxyl) 125 mcg tablet; Take 1 tablet (125 mcg) by mouth early in the morning..    TSH with reflex to Free T4 if abnormal; Future    Elevated blood pressure reading    Orders:    metoprolol succinate XL (Toprol-XL) 25 mg 24 hr tablet; Take 1 tablet (25 mg) by mouth once daily. Do not crush or chew.    Prediabetes    Orders:    Hemoglobin A1C; Future    Gastroesophageal reflux disease without esophagitis    Orders:    famotidine (Pepcid) 40 mg tablet; Take 1 tablet (40 mg) by mouth once daily.    Bipolar I disorder, most recent episode depressed (Multi)  Managed by psychiatry  stable       COPD with asthma (Multi)  On advair-does not take her inhalers=due to the medicine hitting the back of her throat and her not \"liking\" it.   Spacer ordered to be used with her inhalers  Albuterol as needed       Schizophrenia, unspecified type  Managed by psychiatry       Encounter for screening mammogram for breast cancer    Orders:    BI " mammo bilateral screening tomosynthesis; Future    Class 3 severe obesity due to excess calories with serious comorbidity and body mass index (BMI) of 50.0 to 59.9 in adult         Depression Screening  5 - 10 minutes were spent screening for depression.     Follow up in 3 months for chronic care. Labs prior to appointment.     For any new medications that were prescribed today, the patient was educated about their indications for use, administration, frequency and potential side effects of the medication.

## 2025-01-21 NOTE — ASSESSMENT & PLAN NOTE
"On advair-does not take her inhalers=due to the medicine hitting the back of her throat and her not \"liking\" it.   Spacer ordered to be used with her inhalers  Albuterol as needed       "

## 2025-01-24 ENCOUNTER — APPOINTMENT (OUTPATIENT)
Dept: PRIMARY CARE | Facility: CLINIC | Age: 35
End: 2025-01-24
Payer: COMMERCIAL

## 2025-03-17 ENCOUNTER — TELEPHONE (OUTPATIENT)
Dept: PRIMARY CARE | Facility: CLINIC | Age: 35
End: 2025-03-17
Payer: COMMERCIAL

## 2025-03-17 NOTE — TELEPHONE ENCOUNTER
Brittany called in wanting to know if you would be willing to fill out a 504 form for an emotional support animal? Please advise. Thank you.

## 2025-03-25 DIAGNOSIS — E06.3 HYPOTHYROIDISM DUE TO HASHIMOTO'S THYROIDITIS: ICD-10-CM

## 2025-03-25 RX ORDER — LEVOTHYROXINE SODIUM 125 UG/1
125 TABLET ORAL DAILY
Qty: 90 TABLET | Refills: 0 | Status: SHIPPED | OUTPATIENT
Start: 2025-03-25

## 2025-03-31 DIAGNOSIS — E06.3 HYPOTHYROIDISM DUE TO HASHIMOTO'S THYROIDITIS: ICD-10-CM

## 2025-04-04 DIAGNOSIS — J45.40 MODERATE PERSISTENT ASTHMA WITHOUT COMPLICATION (HHS-HCC): ICD-10-CM

## 2025-04-04 RX ORDER — FLUTICASONE PROPIONATE AND SALMETEROL XINAFOATE 115; 21 UG/1; UG/1
2 AEROSOL, METERED RESPIRATORY (INHALATION)
Qty: 12 G | Refills: 5 | Status: SHIPPED | OUTPATIENT
Start: 2025-04-04 | End: 2025-04-04 | Stop reason: CLARIF

## 2025-04-04 RX ORDER — DILTIAZEM HYDROCHLORIDE 60 MG/1
2 TABLET, FILM COATED ORAL 2 TIMES DAILY
Qty: 10.2 G | Refills: 5 | Status: SHIPPED | OUTPATIENT
Start: 2025-04-04

## 2025-04-11 LAB
CHOLEST SERPL-MCNC: 161 MG/DL
CHOLEST/HDLC SERPL: 2.8 (CALC)
EST. AVERAGE GLUCOSE BLD GHB EST-MCNC: 128 MG/DL
EST. AVERAGE GLUCOSE BLD GHB EST-SCNC: 7.1 MMOL/L
HBA1C MFR BLD: 6.1 % OF TOTAL HGB
HDLC SERPL-MCNC: 57 MG/DL
LDLC SERPL CALC-MCNC: 83 MG/DL (CALC)
NONHDLC SERPL-MCNC: 104 MG/DL (CALC)
TRIGL SERPL-MCNC: 117 MG/DL
TSH SERPL-ACNC: 2.91 MIU/L

## 2025-04-23 ENCOUNTER — APPOINTMENT (OUTPATIENT)
Age: 35
End: 2025-04-23
Payer: COMMERCIAL

## 2025-04-25 ENCOUNTER — APPOINTMENT (OUTPATIENT)
Dept: PRIMARY CARE | Facility: CLINIC | Age: 35
End: 2025-04-25
Payer: COMMERCIAL

## 2025-04-25 VITALS
SYSTOLIC BLOOD PRESSURE: 124 MMHG | HEART RATE: 90 BPM | DIASTOLIC BLOOD PRESSURE: 80 MMHG | HEIGHT: 62 IN | WEIGHT: 292.25 LBS | BODY MASS INDEX: 53.78 KG/M2

## 2025-04-25 DIAGNOSIS — E06.3 HYPOTHYROIDISM DUE TO HASHIMOTO'S THYROIDITIS: ICD-10-CM

## 2025-04-25 DIAGNOSIS — J45.40 MODERATE PERSISTENT ASTHMA WITHOUT COMPLICATION (HHS-HCC): Primary | ICD-10-CM

## 2025-04-25 DIAGNOSIS — E78.2 MIXED HYPERLIPIDEMIA: ICD-10-CM

## 2025-04-25 DIAGNOSIS — E66.01 CLASS 3 SEVERE OBESITY DUE TO EXCESS CALORIES WITH SERIOUS COMORBIDITY AND BODY MASS INDEX (BMI) OF 50.0 TO 59.9 IN ADULT: ICD-10-CM

## 2025-04-25 DIAGNOSIS — J30.2 SEASONAL ALLERGIES: ICD-10-CM

## 2025-04-25 DIAGNOSIS — E66.813 CLASS 3 SEVERE OBESITY DUE TO EXCESS CALORIES WITH SERIOUS COMORBIDITY AND BODY MASS INDEX (BMI) OF 50.0 TO 59.9 IN ADULT: ICD-10-CM

## 2025-04-25 DIAGNOSIS — R73.03 PREDIABETES: ICD-10-CM

## 2025-04-25 DIAGNOSIS — E55.9 VITAMIN D DEFICIENCY: ICD-10-CM

## 2025-04-25 DIAGNOSIS — M79.671 PAIN IN BOTH FEET: ICD-10-CM

## 2025-04-25 DIAGNOSIS — K21.9 GASTROESOPHAGEAL REFLUX DISEASE WITHOUT ESOPHAGITIS: ICD-10-CM

## 2025-04-25 DIAGNOSIS — M79.672 PAIN IN BOTH FEET: ICD-10-CM

## 2025-04-25 PROBLEM — D75.839 THROMBOCYTOSIS: Status: ACTIVE | Noted: 2024-03-11

## 2025-04-25 PROCEDURE — 99214 OFFICE O/P EST MOD 30 MIN: CPT | Performed by: NURSE PRACTITIONER

## 2025-04-25 PROCEDURE — G2211 COMPLEX E/M VISIT ADD ON: HCPCS | Performed by: NURSE PRACTITIONER

## 2025-04-25 PROCEDURE — 3008F BODY MASS INDEX DOCD: CPT | Performed by: NURSE PRACTITIONER

## 2025-04-25 RX ORDER — LORATADINE 10 MG/1
10 TABLET ORAL DAILY
Qty: 30 TABLET | Refills: 5 | Status: SHIPPED | OUTPATIENT
Start: 2025-04-25

## 2025-04-25 RX ORDER — FAMOTIDINE 40 MG/1
40 TABLET, FILM COATED ORAL DAILY
Qty: 30 TABLET | Refills: 5 | Status: SHIPPED | OUTPATIENT
Start: 2025-04-25

## 2025-04-25 RX ORDER — MELOXICAM 15 MG/1
15 TABLET ORAL DAILY PRN
Qty: 90 TABLET | Refills: 1 | Status: SHIPPED | OUTPATIENT
Start: 2025-04-25

## 2025-04-25 ASSESSMENT — ENCOUNTER SYMPTOMS
LOSS OF SENSATION IN FEET: 0
OCCASIONAL FEELINGS OF UNSTEADINESS: 0
DEPRESSION: 0

## 2025-04-25 NOTE — PROGRESS NOTES
"Subjective   Patient ID: Brittany Shaver is a 34 y.o. female who presents for Follow-up (3 Month follow up    with lab).    SAM Buenrostro returns with her  for lab review and follow up.     She reports she is still having a lot of pain in her feet. Her podiatrist originally prescribed her meloxicam. She would like a refill on this.     Prediabetes, her A1c increased from 5.6 to 6.1%.   Hyperlipidemia: stable. Taking her statin without any symptoms of new or worsening muscle aches.     Asthma: stable on symbicort.     Review of Systems   Constitutional:  Negative for fatigue.   Respiratory:  Negative for chest tightness and shortness of breath.    Cardiovascular:  Negative for chest pain, palpitations and leg swelling.   Gastrointestinal:  Negative for abdominal pain, blood in stool, constipation, diarrhea, nausea and vomiting.   Genitourinary:  Negative for dysuria.   Musculoskeletal:  Positive for arthralgias (bilateral foot pain). Negative for myalgias.   Skin:  Negative for color change.   Neurological:  Negative for dizziness, light-headedness and headaches.   Psychiatric/Behavioral: Negative.         Objective   /80   Pulse 90   Ht 1.575 m (5' 2\")   Wt 133 kg (292 lb 4 oz)   BMI 53.45 kg/m²     Physical Exam  Vitals and nursing note reviewed.   Constitutional:       Appearance: Normal appearance.   HENT:      Head: Normocephalic and atraumatic.   Cardiovascular:      Rate and Rhythm: Normal rate and regular rhythm.      Pulses: Normal pulses.      Heart sounds: Normal heart sounds.   Pulmonary:      Effort: Pulmonary effort is normal.      Breath sounds: Normal breath sounds.   Abdominal:      General: Bowel sounds are normal.      Palpations: Abdomen is soft.   Skin:     General: Skin is warm and dry.   Neurological:      General: No focal deficit present.      Mental Status: She is alert and oriented to person, place, and time.   Psychiatric:         Mood and Affect: Mood normal.         " Behavior: Behavior normal.         Thought Content: Thought content normal.         Judgment: Judgment normal.         Assessment/Plan   Problem List Items Addressed This Visit           ICD-10-CM    GERD (gastroesophageal reflux disease) K21.9    Relevant Medications    famotidine (Pepcid) 40 mg tablet    Hyperlipidemia E78.5    4/10/25: chol 161, HDL 57, LDL 83, Tri 117  Continue atorvastatin 20 mg nightly         Moderate persistent asthma without complication ([facility]) - Primary J45.40    Continue symbicort  Albuterol as needed           Vitamin D deficiency E55.9    Relevant Orders    Vitamin D 25-Hydroxy,Total (for eval of Vitamin D levels)    Hypothyroidism due to Hashimoto's thyroiditis E06.3    4/10/25: TSH 2.91  Levothyroxine 125 mcg daily         Class 3 severe obesity due to excess calories with serious comorbidity and body mass index (BMI) of 50.0 to 59.9 in adult E66.813, Z68.43, E66.01    Prediabetes R73.03    4/10/25: A1c 6.1%  Discussed dietary changes. Her  is going to help her pick better foods when they go grocery shopping today.         Relevant Orders    Hemoglobin A1C    Comprehensive Metabolic Panel    Seasonal allergies J30.2    Relevant Medications    loratadine (Claritin) 10 mg tablet     Other Visit Diagnoses         Codes      Pain in both feet     M79.671, M79.672    Relevant Medications    meloxicam (Mobic) 15 mg tablet             Follow up in 3 months. Lab work prior to appointment. Return precautions discussed.     For any new medications that were prescribed today, the patient was educated about their indications for use, administration, frequency and potential side effects of the medication.

## 2025-04-27 ASSESSMENT — ENCOUNTER SYMPTOMS
DYSURIA: 0
HEADACHES: 0
BLOOD IN STOOL: 0
FATIGUE: 0
CHEST TIGHTNESS: 0
SHORTNESS OF BREATH: 0
PALPITATIONS: 0
COLOR CHANGE: 0
ABDOMINAL PAIN: 0
CONSTIPATION: 0
NAUSEA: 0
MYALGIAS: 0
PSYCHIATRIC NEGATIVE: 1
DIZZINESS: 0
LIGHT-HEADEDNESS: 0
VOMITING: 0
ARTHRALGIAS: 1
DIARRHEA: 0

## 2025-04-27 NOTE — ASSESSMENT & PLAN NOTE
4/10/25: A1c 6.1%  Discussed dietary changes. Her  is going to help her pick better foods when they go grocery shopping today.

## 2025-04-27 NOTE — PATIENT INSTRUCTIONS
Watch your diet in the areas of Dairy-making sure to eat low fat/fat free. Decrease red meat consumption to once or twice a week (this includes pork products). Limit carbs (pasta, bread, potatoes). Eat chicken, turkey, fish, fresh vegetables, and fresh fruit.   Exercise at least 5 days a week for 30-60 minutes a day.

## 2025-05-19 ENCOUNTER — OFFICE VISIT (OUTPATIENT)
Dept: URGENT CARE | Facility: CLINIC | Age: 35
End: 2025-05-19
Payer: COMMERCIAL

## 2025-05-19 VITALS
TEMPERATURE: 99.8 F | HEART RATE: 105 BPM | SYSTOLIC BLOOD PRESSURE: 138 MMHG | OXYGEN SATURATION: 96 % | BODY MASS INDEX: 53.95 KG/M2 | DIASTOLIC BLOOD PRESSURE: 81 MMHG | RESPIRATION RATE: 15 BRPM | WEIGHT: 293 LBS

## 2025-05-19 DIAGNOSIS — R60.0 MILD PERIPHERAL EDEMA: Primary | ICD-10-CM

## 2025-05-19 DIAGNOSIS — E66.813 CLASS 3 SEVERE OBESITY DUE TO EXCESS CALORIES WITHOUT SERIOUS COMORBIDITY WITH BODY MASS INDEX (BMI) OF 50.0 TO 59.9 IN ADULT: ICD-10-CM

## 2025-05-19 DIAGNOSIS — D69.1 ABNORMAL PLATELETS (MULTI): ICD-10-CM

## 2025-05-19 DIAGNOSIS — J44.89 COPD WITH ASTHMA (MULTI): ICD-10-CM

## 2025-05-19 DIAGNOSIS — F20.9 SCHIZOPHRENIA, UNSPECIFIED TYPE: ICD-10-CM

## 2025-05-19 PROCEDURE — 99212 OFFICE O/P EST SF 10 MIN: CPT | Performed by: PHYSICIAN ASSISTANT

## 2025-05-19 NOTE — PROGRESS NOTES
[facility] URGENT CARE JEAN CARLOS NOTE:      Name: Brittany Shaver, 35 y.o.    CSN:[id number]   MRN:[id number]    PCP: JOSH Bhakta    ALL:  Allergies[1]    History:    Chief Complaint: swelling (In hands, legs, face x noticed yesterday)    Encounter Date: 5/19/2025      HPI: The history was obtained from the patient. Brittany is a 35 y.o. female, who presents with a chief complaint of swelling (In hands, legs, face x noticed yesterday)     Past medical history includes intellectual disability, schizophrenia, tardive dyskinesia, LAURA, depression, hyperlipidemia, Hashimoto's thyroiditis, prediabetes, and asthma.     Reports diffuse swelling and weight gain accumulating over the past week. Self reported weight measurements using home scale were 270 lbs 1 week ago and 302 lbs this morning. Her office weight measurement today was 292 lbs. She is concerned as she said her mother also experienced edema.    She denies any anginal symptoms, wheezing, orthopnea, PND. She reports a history of vertigo and dizziness upon standing.     She regularly eats sodium enriched foods such as packaged food items/precooked meals. Also admits to indulging on more sugary foods as of late in celebration of her birthday.      She had several labs done in April and January/December timeframe. Of note, her A1c increased to 6.1 in April from her previous value of 5.6 in August, 2024. Last reported TSH, lipids, and kidney function were within normal limits. LFTs mildly elevated. She had an echocardiogram performed in Sept 2024, which revealed normal left ventricular systolic function and no significant valvular issues.     Pertinent medications include Abilify, which she has been taking since April 2024 for schizophrenia and meloxicam for neuropathic foot pain. Also is adherent to levothyroxine. On metoprolol for blood pressure management.       PMHx:    Medical History[2]         Current  Medications[3]      PMSx:  Surgical History[4]    Fam Hx: Family History[5]    SOC. Hx:     Social History     Socioeconomic History   • Marital status: Single     Spouse name: Not on file   • Number of children: Not on file   • Years of education: Not on file   • Highest education level: Not on file   Occupational History   • Not on file   Tobacco Use   • Smoking status: Former     Types: Cigarettes   • Smokeless tobacco: Never   Vaping Use   • Vaping status: Never Used   Substance and Sexual Activity   • Alcohol use: Yes     Comment: rare   • Drug use: Never   • Sexual activity: Not on file   Other Topics Concern   • Not on file   Social History Narrative   • Not on file     Social Drivers of Health     Financial Resource Strain: High Risk (10/18/2024)    Received from [facility] SDOH Screening    • In the past year, have you been unable to get any of the following when you really needed them? choose all that apply.: Internet   Food Insecurity: High Risk (11/2/2024)    Received from [facility] SDOH Screening    • In the past 2 months, did you or others you live with eat smaller meals or skip meals because you didn't have money for food?: Yes   Transportation Needs: Not on file   Physical Activity: Not on file   Stress: Not on file   Social Connections: Not on file   Intimate Partner Violence: Not on file   Housing Stability: Not on file         Vitals:    05/19/25 1451   BP: 138/81   Pulse: 105   Resp: 15   Temp: 37.7 °C (99.8 °F)   SpO2: 96%     134 kg (294 lb 15.6 oz)          Physical Exam  Vitals reviewed. Nursing note reviewed: accompanied by male visitor.  Constitutional:       Appearance: Normal appearance. She is obese.   HENT:      Head: Normocephalic.   Eyes:      Extraocular Movements: Extraocular movements intact.   Cardiovascular:      Rate and Rhythm: Normal rate and regular rhythm.      Heart sounds: Normal heart sounds. Heart sounds not distant. No murmur  heard.  Pulmonary:      Effort: Pulmonary effort is normal.      Breath sounds: Normal breath sounds.   Musculoskeletal:         General: No tenderness, deformity or signs of injury. Normal range of motion.      Cervical back: Normal range of motion.      Right lower leg: Edema (trace) present.      Left lower leg: Edema (trace) present.   Skin:     General: Skin is warm.      Capillary Refill: Capillary refill takes less than 2 seconds.   Neurological:      General: No focal deficit present.      Mental Status: She is alert and oriented to person, place, and time.      Deep Tendon Reflexes: Reflexes abnormal (consistent possibly with TD).   Psychiatric:         Mood and Affect: Mood normal.         Behavior: Behavior normal.           ____________________________________________________________________    I did personally review Brittany's past medical history, surgical history, social history, as well as family history (when relevant).  In this case, I also oversaw the her drug management by reviewing her medication list, allergy list, as well as the medications that I prescribed during the UC course and/or recommended as an out-patient (including possible OTC medications such as acetaminophen, NSAIDs , etc).    After reviewing the items above, I did look at previous medical documentation, such as recent hospitalizations, office visits, and/or recent consultations with PCP/specialist.                          SDOH:   Another factor that I considered in Brittany's care was her Social Determinants of Health (SDOH). During this UC encounter, she did not have social determinants of health. Those SDOH influencing Brittany's care are: none      UC COURSE/MEDICAL DECISION MAKING:    Brittany is a 35 y.o., who presents with a working diagnosis of   1. Mild peripheral edema    2. Schizophrenia, unspecified type    3. COPD with asthma (Multi)    4. BMI 45.0-49.9, adult (Multi)    5. Class 3 severe obesity due to excess calories  without serious comorbidity with body mass index (BMI) of 50.0 to 59.9 in adult    6. Abnormal platelets (Multi)     with a differential to include:   CHF, pulmonary hypertension, valvular dysfunction, medication-induced, excess salt and fluid intake, hypothyroid-induced.     Plan:   I explained to the patient some possible contributing factors of her recent peripheral edema and weight gain, including elevated salt and fluid intake, use of certain medications like meloxicam and abilify, and sedentary behavior. I encouraged limiting total amount of sodium per day to 2 grams. I provided her with educational hand outs on health benefits of managing sodium, examples of high salt containing foods (actually showed her on a can of soup and ramen noodles how to count sodium), and how to determine the amount of sodium she is consuming based on the food label and serving size. I also instructed her to hold off on taking meloxicam until swelling subsides due to its salt-retaining effect on the kidneys.  Additionally I recommended use of compression stockings and legs elevations to reduce venous pooling.   I did not recommend the addition of a new medication at this time to alleviate mild edema. If her symptoms worsen or she does not see improvement, she was encouraged to return for further management.     I, VIDHI Dow student, helped prepare the medical record for my supervising clinician, Aryan Bradford PA-C.   CHRISTI Dow, [facility]    Supervised by  Aryan Bradford PA-C   Advanced Practice Provider  [facility] URGENT CARE    I was present with the VIDHI edwards who participated in the documentation of this note. I have personally seen and re-examined the patient and performed the medical decision-making components (assessment and plan of care). I have reviewed the PA student documentation and verified the findings in the note as written with additions or exceptions  "as stated in the body of this note.    Aryan Bradford PA-C                [1]  Allergies  Allergen Reactions   • Lithium Other     Pt claims lethargy or \"zombiefied\"   • Montelukast Other     \"Passing out per patient\"   • Penicillin Confusion   [2]  Past Medical History:  Diagnosis Date   • Claustrophobia 01/08/2024   • Disease of thyroid gland    • Graves' disease 11/06/2020   • Noncompliance with medication treatment due to underuse of medication 10/06/2023   • Plantar fasciitis 04/29/2021   • Sensorineural hearing loss (SNHL) of both ears 10/07/2021   [3]  Current Outpatient Medications   Medication Sig Dispense Refill   • acetaminophen (TylenoL) 325 mg tablet Take 2 tablets (650 mg) by mouth every 6 hours if needed for mild pain (1 - 3) or headaches. 30 tablet 0   • albuterol 90 mcg/actuation inhaler Inhale 2 puffs every 6 hours if needed for shortness of breath or wheezing. 18 g 2   • ARIPiprazole (Abilify) 20 mg tablet      • atorvastatin (Lipitor) 20 mg tablet Take 1 tablet (20 mg) by mouth once daily. 30 tablet 5   • benztropine (Cogentin) 0.5 mg tablet      • busPIRone (Buspar) 5 mg tablet 1 tablet (5 mg). In the am in addition to the 7.5 mg     • busPIRone (Buspar) 7.5 mg tablet Take 1 tablet (7.5 mg) by mouth twice a day.     • DOCOSAHEXAENOIC ACID ORAL Take 1 capsule by mouth once daily.     • drospirenone, contraceptive, (Slynd) 4 mg (28) tablet Take 1 tablet by mouth once daily.     • ergocalciferol (Vitamin D-2) 1.25 MG (40472 UT) capsule Take 1 capsule (50,000 Units) by mouth 1 (one) time per week. 4 capsule 5   • famotidine (Pepcid) 40 mg tablet Take 1 tablet (40 mg) by mouth once daily. 30 tablet 5   • folic acid (Folvite) 1 mg tablet Take 1 tablet (1 mg) by mouth once daily.     • Ingrezza 40 mg capsule Take 2 capsules (80 mg) by mouth once daily.     • inhalational spacing device inhaler Used as directed with inhalers. 1 each 0   • levothyroxine (Synthroid, Levoxyl) 125 mcg tablet Take 1 " tablet (125 mcg) by mouth early in the morning.. 90 tablet 0   • loratadine (Claritin) 10 mg tablet Take 1 tablet (10 mg) by mouth once daily. 30 tablet 5   • meloxicam (Mobic) 15 mg tablet Take 1 tablet (15 mg) by mouth once daily as needed for moderate pain (4 - 6). 90 tablet 1   • metoprolol succinate XL (Toprol-XL) 25 mg 24 hr tablet Take 1 tablet (25 mg) by mouth once daily. Do not crush or chew. 30 tablet 5   • Symbicort 80-4.5 mcg/actuation inhaler Inhale 2 puffs 2 times a day. Rinse mouth with water after use to reduce aftertaste and incidence of candidiasis. Do not swallow. 10.2 g 5     No current facility-administered medications for this visit.   [4]  Past Surgical History:  Procedure Laterality Date   • FINGER SURGERY     [5]  Family History  Problem Relation Name Age of Onset   • Heart disease Mother     • Hypertension Mother     • Heart disease Father     • Hypertension Father     Patient was identified as a fall risk. Risk prevention instructions provided.

## 2025-05-19 NOTE — PATIENT INSTRUCTIONS

## 2025-05-19 NOTE — PROGRESS NOTES
"[facility] URGENT CARE   JEAN CARLOS NOTE:      Name: Brittany Shaver, 35 y.o.    CSN:[id number]   MRN:[id number]    PCP: Oma Locke, APRN-CNP    ALL:  Allergies[1]    History:    Chief Complaint: swelling (In hands, legs, face x noticed yesterday)    Encounter Date: 5/19/2025  ***    HPI: The history was obtained from the {[facility] HISTORIAN:[id number]::\"patient\"}Milagro Soto is a 35 y.o. female, who presents with a chief complaint of swelling (In hands, legs, face x noticed yesterday) ***    PMHx:    Medical History[2]         Current Medications[3]      PMSx:  Surgical History[4]    Fam Hx: Family History[5]    SOC. Hx:     Social History     Socioeconomic History    Marital status: Single     Spouse name: Not on file    Number of children: Not on file    Years of education: Not on file    Highest education level: Not on file   Occupational History    Not on file   Tobacco Use    Smoking status: Former     Types: Cigarettes    Smokeless tobacco: Never   Vaping Use    Vaping status: Never Used   Substance and Sexual Activity    Alcohol use: Yes     Comment: rare    Drug use: Never    Sexual activity: Not on file   Other Topics Concern    Not on file   Social History Narrative    Not on file     Social Drivers of Health     Financial Resource Strain: High Risk (10/18/2024)    Received from [facility] SDOH Screening     In the past year, have you been unable to get any of the following when you really needed them? choose all that apply.: Internet   Food Insecurity: High Risk (11/2/2024)    Received from [facility] SDOH Screening     In the past 2 months, did you or others you live with eat smaller meals or skip meals because you didn't have money for food?: Yes   Transportation Needs: Not on file   Physical Activity: Not on file   Stress: Not on file   Social Connections: Not on file   Intimate Partner Violence: Not on file   Housing Stability: Not on file         Vitals:    05/19/25 " 1451   BP: 138/81   Pulse: 105   Resp: 15   Temp: 37.7 °C (99.8 °F)   SpO2: 96%     134 kg (294 lb 15.6 oz)          Physical Exam  ***    LABORATORY @ RADIOLOGICAL IMAGING (if done):     No results found for this or any previous visit (from the past 24 hours).    ____________________________________________________________________    I did personally review Brittany's past medical history, surgical history, social history, as well as family history (when relevant).  In this case, I also oversaw the her drug management by reviewing her medication list, allergy list, as well as the medications that I prescribed during the UC course and/or recommended as an out-patient (including possible OTC medications such as acetaminophen, NSAIDs , etc).    After reviewing the items above, I {DID/DID NOT:04561} look at previous medical documentation, such as recent hospitalizations, office visits, and/or recent consultations with PCP/specialist.                          SDOH:   Another factor that I considered in Brittany's care was her Social Determinants of Health (SDOH). During this UC encounter, she {DID/DID NOT:12863} have social determinants of health. Those SDOH influencing Brittany's care are: {pkvsdoh:69083}      _____________________________________________________________________      UC COURSE/MEDICAL DECISION MAKING:    Brittany is a 35 y.o., who presents with a working diagnosis of No diagnosis found. with a differential to include:         Aryan Bradford PA-C   Advanced Practice Provider  [facility] URGENT CARE    Please note: While the patient may or may not have received printed discharge paperwork, all relevant medical findings, test results, and treatment details are accessible through the electronic medical record system. The patient is encouraged to review their chart via the patient portal for comprehensive information and follow-up instructions.         [1]   Allergies  Allergen  "Reactions    Lithium Other     Pt claims lethargy or \"zombiefied\"    Montelukast Other     \"Passing out per patient\"    Penicillin Confusion   [2]   Past Medical History:  Diagnosis Date    Claustrophobia 01/08/2024    Disease of thyroid gland     Graves' disease 11/06/2020    Noncompliance with medication treatment due to underuse of medication 10/06/2023    Plantar fasciitis 04/29/2021    Sensorineural hearing loss (SNHL) of both ears 10/07/2021   [3]   Current Outpatient Medications   Medication Sig Dispense Refill    acetaminophen (TylenoL) 325 mg tablet Take 2 tablets (650 mg) by mouth every 6 hours if needed for mild pain (1 - 3) or headaches. 30 tablet 0    albuterol 90 mcg/actuation inhaler Inhale 2 puffs every 6 hours if needed for shortness of breath or wheezing. 18 g 2    ARIPiprazole (Abilify) 20 mg tablet       atorvastatin (Lipitor) 20 mg tablet Take 1 tablet (20 mg) by mouth once daily. 30 tablet 5    benztropine (Cogentin) 0.5 mg tablet       busPIRone (Buspar) 5 mg tablet 1 tablet (5 mg). In the am in addition to the 7.5 mg      busPIRone (Buspar) 7.5 mg tablet Take 1 tablet (7.5 mg) by mouth twice a day.      DOCOSAHEXAENOIC ACID ORAL Take 1 capsule by mouth once daily.      drospirenone, contraceptive, (Slynd) 4 mg (28) tablet Take 1 tablet by mouth once daily.      ergocalciferol (Vitamin D-2) 1.25 MG (97194 UT) capsule Take 1 capsule (50,000 Units) by mouth 1 (one) time per week. 4 capsule 5    famotidine (Pepcid) 40 mg tablet Take 1 tablet (40 mg) by mouth once daily. 30 tablet 5    folic acid (Folvite) 1 mg tablet Take 1 tablet (1 mg) by mouth once daily.      Ingrezza 40 mg capsule Take 2 capsules (80 mg) by mouth once daily.      inhalational spacing device inhaler Used as directed with inhalers. 1 each 0    levothyroxine (Synthroid, Levoxyl) 125 mcg tablet Take 1 tablet (125 mcg) by mouth early in the morning.. 90 tablet 0    loratadine (Claritin) 10 mg tablet Take 1 tablet (10 mg) by mouth " once daily. 30 tablet 5    meloxicam (Mobic) 15 mg tablet Take 1 tablet (15 mg) by mouth once daily as needed for moderate pain (4 - 6). 90 tablet 1    metoprolol succinate XL (Toprol-XL) 25 mg 24 hr tablet Take 1 tablet (25 mg) by mouth once daily. Do not crush or chew. 30 tablet 5    Symbicort 80-4.5 mcg/actuation inhaler Inhale 2 puffs 2 times a day. Rinse mouth with water after use to reduce aftertaste and incidence of candidiasis. Do not swallow. 10.2 g 5     No current facility-administered medications for this visit.   [4]   Past Surgical History:  Procedure Laterality Date    FINGER SURGERY     [5]   Family History  Problem Relation Name Age of Onset    Heart disease Mother      Hypertension Mother      Heart disease Father      Hypertension Father

## 2025-07-03 ENCOUNTER — TELEPHONE (OUTPATIENT)
Dept: PRIMARY CARE | Facility: CLINIC | Age: 35
End: 2025-07-03
Payer: COMMERCIAL

## 2025-07-03 DIAGNOSIS — J45.40 MODERATE PERSISTENT ASTHMA WITHOUT COMPLICATION (HHS-HCC): ICD-10-CM

## 2025-07-03 DIAGNOSIS — E06.3 HYPOTHYROIDISM DUE TO HASHIMOTO'S THYROIDITIS: ICD-10-CM

## 2025-07-03 RX ORDER — LEVOTHYROXINE SODIUM 125 UG/1
TABLET ORAL
Qty: 8 TABLET | OUTPATIENT
Start: 2025-07-03

## 2025-07-03 RX ORDER — LEVOTHYROXINE SODIUM 125 UG/1
125 TABLET ORAL DAILY
Qty: 90 TABLET | Refills: 0 | Status: SHIPPED | OUTPATIENT
Start: 2025-07-03

## 2025-07-03 RX ORDER — ALBUTEROL SULFATE 90 UG/1
2 INHALANT RESPIRATORY (INHALATION) EVERY 6 HOURS PRN
Qty: 18 G | Refills: 2 | Status: SHIPPED | OUTPATIENT
Start: 2025-07-03 | End: 2026-07-03

## 2025-07-03 NOTE — TELEPHONE ENCOUNTER
Britatny stopped in the office requesting refills on Levothyroxine and her rescue inhaler. Pharmacy is [address]. Please advise. Thank you.

## 2025-08-01 DIAGNOSIS — R03.0 ELEVATED BLOOD PRESSURE READING: ICD-10-CM

## 2025-08-01 RX ORDER — METOPROLOL SUCCINATE 25 MG/1
TABLET, EXTENDED RELEASE ORAL
Qty: 30 TABLET | Refills: 5 | Status: SHIPPED | OUTPATIENT
Start: 2025-08-01

## 2025-08-08 ENCOUNTER — APPOINTMENT (OUTPATIENT)
Dept: PRIMARY CARE | Facility: CLINIC | Age: 35
End: 2025-08-08
Payer: COMMERCIAL

## 2025-08-20 LAB
25(OH)D3+25(OH)D2 SERPL-MCNC: 49 NG/ML (ref 30–100)
ALBUMIN SERPL-MCNC: 4.2 G/DL (ref 3.6–5.1)
ALP SERPL-CCNC: 131 U/L (ref 31–125)
ALT SERPL-CCNC: 28 U/L (ref 6–29)
ANION GAP SERPL CALCULATED.4IONS-SCNC: 8 MMOL/L (CALC) (ref 7–17)
AST SERPL-CCNC: 18 U/L (ref 10–30)
BILIRUB SERPL-MCNC: 0.4 MG/DL (ref 0.2–1.2)
BUN SERPL-MCNC: 13 MG/DL (ref 7–25)
CALCIUM SERPL-MCNC: 8.7 MG/DL (ref 8.6–10.2)
CHLORIDE SERPL-SCNC: 103 MMOL/L (ref 98–110)
CO2 SERPL-SCNC: 28 MMOL/L (ref 20–32)
CREAT SERPL-MCNC: 0.68 MG/DL (ref 0.5–0.97)
EGFRCR SERPLBLD CKD-EPI 2021: 116 ML/MIN/1.73M2
EST. AVERAGE GLUCOSE BLD GHB EST-MCNC: 120 MG/DL
EST. AVERAGE GLUCOSE BLD GHB EST-SCNC: 6.6 MMOL/L
GLUCOSE SERPL-MCNC: 123 MG/DL (ref 65–99)
HBA1C MFR BLD: 5.8 %
POTASSIUM SERPL-SCNC: 4.6 MMOL/L (ref 3.5–5.3)
PROT SERPL-MCNC: 6.9 G/DL (ref 6.1–8.1)
SODIUM SERPL-SCNC: 139 MMOL/L (ref 135–146)

## 2025-09-26 ENCOUNTER — APPOINTMENT (OUTPATIENT)
Dept: PRIMARY CARE | Facility: CLINIC | Age: 35
End: 2025-09-26
Payer: COMMERCIAL